# Patient Record
Sex: MALE | Race: WHITE | NOT HISPANIC OR LATINO | Employment: FULL TIME | URBAN - METROPOLITAN AREA
[De-identification: names, ages, dates, MRNs, and addresses within clinical notes are randomized per-mention and may not be internally consistent; named-entity substitution may affect disease eponyms.]

---

## 2017-01-30 ENCOUNTER — TELEPHONE (OUTPATIENT)
Dept: FAMILY MEDICINE | Facility: CLINIC | Age: 37
End: 2017-01-30

## 2017-01-30 NOTE — TELEPHONE ENCOUNTER
LOW BACK PAIN nursing triage note:  Jerome Urbina is a 36 year old male reports having  Upper back pain. Pain began this morning. Mechanism of injury:unknown.  The pain is described as spasms and it is located in the center low back. The pain Does not radiate    Does this patient have ANY of the following conditions?  Sudden onset or otherwise unexplained loss or changes in bowel or bladder control? No   Numbness in the groin / hip area (saddle anesthesia)? No   Suspected Fracture (due to mechanism of injury, history of osteoporosis, or long-term steroid use) or history of Cancer? No     Have you had your back pain for more than 6 weeks? No   Fever 38 C or 100.4 F for greater than 48 hours? No   Unrelenting night pain or no relief of pain at rest? No   Abdominal pain, urinary symptoms and/or nausea/vomiting? No   New onset of numbness or weakness of the leg not attributed to pain? No   Less than 18 years old or older than 65? No   Patient pregnant? No   Work or MVA-related injury was cause of back pain? No   Patient has history of gastric ulcers, chronic kidney disease, CHF, or coumadin use? No   Patient sees another specialist for LBP? No     Other symptoms or additional concerns: none  Discussed symptom(s) with provider NO        Functional and Psychosocial Screen (Cinthia STarT Back):    Not performed today    Follow up: apt made using same day hold for tomorrow, today he will use ice, Advil and gentle stretches. He will go to urgent care for numbness tingling to extremity's, fever, change in bowel or bladder habits or pain worsens.  Patient verbalizes understanding of this plan.        Georgie Landry,Clinic Rn  [place]

## 2017-01-31 ENCOUNTER — OFFICE VISIT (OUTPATIENT)
Dept: FAMILY MEDICINE | Facility: CLINIC | Age: 37
End: 2017-01-31
Payer: COMMERCIAL

## 2017-01-31 VITALS
RESPIRATION RATE: 20 BRPM | DIASTOLIC BLOOD PRESSURE: 82 MMHG | HEIGHT: 72 IN | TEMPERATURE: 97.9 F | WEIGHT: 220 LBS | SYSTOLIC BLOOD PRESSURE: 118 MMHG | BODY MASS INDEX: 29.8 KG/M2 | HEART RATE: 72 BPM

## 2017-01-31 DIAGNOSIS — S29.012A UPPER BACK STRAIN, INITIAL ENCOUNTER: Primary | ICD-10-CM

## 2017-01-31 PROCEDURE — 99213 OFFICE O/P EST LOW 20 MIN: CPT | Performed by: FAMILY MEDICINE

## 2017-01-31 RX ORDER — CYCLOBENZAPRINE HCL 10 MG
10 TABLET ORAL
Qty: 14 TABLET | Refills: 1 | Status: SHIPPED | OUTPATIENT
Start: 2017-01-31 | End: 2018-12-21

## 2017-01-31 NOTE — PROGRESS NOTES
SUBJECTIVE:                                                    Jerome Urbina is a 36 year old male who presents to clinic today for the following health issues:      Back Pain      Duration: 2 days        Specific cause: turning/bending    Description:   Location of pain: middle of back bilateral  Character of pain: sharp and intermittent  Pain radiation:none  New numbness or weakness in legs, not attributed to pain:  no     Intensity: Currently 6/10    History:   Pain interferes with job: YES, off for the next few weeks -  baby at home   History of back problems: no prior back problems  Any previous MRI or X-rays: None  Sees a specialist for back pain:  No  Therapies tried without relief: Advil    Alleviating factors:   Improved by: cold      Precipitating factors:  Worsened by: Bending and Walking    Functional and Psychosocial Screen (Cinthia STarT Back):      -       Accompanying Signs & Symptoms:  Risk of Fracture:  None  Risk of Cauda Equina:  None  Risk of Infection:  None  Risk of Cancer:  None  Risk of Ankylosing Spondylitis:  Onset at age <35, male, AND morning back stiffness. no                          No trauma, no bladder and bowel problems, no rectal numbness,           Problem list and histories reviewed & adjusted, as indicated.  Additional history: as documented    Patient Active Problem List   Diagnosis     CARDIOVASCULAR SCREENING; LDL GOAL LESS THAN 160     Hypertension goal BP (blood pressure) < 140/90     Past Surgical History   Procedure Laterality Date     Surgical history of -   00     Right Shoulder Arthroscopy  (Dr. Pappas @ [facility])       Social History   Substance Use Topics     Smoking status: Never Smoker      Smokeless tobacco: Former User     Types: Chew     Quit date: 2007      Comment: Does on social occasions, smokes a cigar or chews tobacco.     Alcohol Use: No     Family History   Problem Relation Age of Onset     Hypertension Father       "DIABETES Maternal Grandmother      Cardiovascular Maternal Grandmother      Alcohol/Drug Maternal Grandfather      alcohol     Alzheimer Disease Paternal Grandfather      CANCER Mother      Breast     CANCER Father      skin cancer, sq           ROS:  Constitutional, HEENT, cardiovascular, pulmonary, GI, , musculoskeletal, neuro, skin, endocrine and psych systems are negative, except as otherwise noted.    OBJECTIVE:                                                    /82 mmHg  Pulse 72  Temp(Src) 97.9  F (36.6  C) (Oral)  Resp 20  Ht 5' 11.75\" (1.822 m)  Wt 220 lb (99.791 kg)  BMI 30.06 kg/m2  Body mass index is 30.06 kg/(m^2).  GENERAL: healthy, alert and no distress  RESP: lungs clear to auscultation - no rales, rhonchi or wheezes  CV: regular rate and rhythm, normal S1 S2, no S3 or S4, no murmur, click or rub, no peripheral edema and peripheral pulses strong  ABDOMEN: soft, nontender, no hepatosplenomegaly, no masses and bowel sounds normal  MS: no gross musculoskeletal defects noted, no edema  NEURO: Normal strength and tone, mentation intact and speech normal  Comprehensive back pain exam:  Tenderness of mid back paraspinal muscles bilaterally, Range of motion not limited by pain, Lower extremity strength functional and equal on both sides, Lower extremity reflexes within normal limits bilaterally, Lower extremity sensation normal and equal on both sides and Straight leg raise negative bilaterally    Diagnostic Test Results:  none      ASSESSMENT/PLAN:                                                        ICD-10-CM    1. Upper back strain, initial encounter S29.012A cyclobenzaprine (FLEXERIL) 10 MG tablet     [facility], [facility], AND CH[facility]RACTIC REFERRAL     Advised ice/heat/nsaids, muscle relaxants  Follow up with physical therapy as planned    See Patient Instructions    Josh Coreas [facility]    "

## 2017-01-31 NOTE — NURSING NOTE
"Chief Complaint   Patient presents with     Back Pain     mid back pain since yesterday        Initial /82 mmHg  Pulse 72  Temp(Src) 97.9  F (36.6  C) (Oral)  Resp 20  Ht 5' 11.75\" (1.822 m)  Wt 220 lb (99.791 kg)  BMI 30.06 kg/m2 Estimated body mass index is 30.06 kg/(m^2) as calculated from the following:    Height as of this encounter: 5' 11.75\" (1.822 m).    Weight as of this encounter: 220 lb (99.791 kg).  BP completed using cuff size: bertha Beyer CMA (AAMA)      "

## 2017-01-31 NOTE — MR AVS SNAPSHOT
After Visit Summary   1/31/2017    Jerome Urbina    MRN: [id number]           Patient Information     Date Of Birth          1980        Visit Information        Provider Department      1/31/2017 10:20 AM Josh [facility]as DO [facility]        Today's Diagnoses     Upper back strain, initial encounter    -  1       Care Instructions    Please use aleve or muscle relaxants  Follow up with physical therapy if not resolving  Josh Coreas D.O.    [facility]   Discharged by : Melissa Chao MA    Paper scripts provided to patient : no     If you have any questions regarding your visit please contact your care team:     Team Gold Clinic Hours Telephone Number   Dr. Beatrice Mari PA-C   7am-7pm Monday - Thursday   7am-5pm Fridays  [phone]   (Appointment scheduling available 24/7)   RN Line   [phone] option 2       For a Price Quote for your services, please call our Consumer Price Line at [phone].     What options do I have for visits at the clinic other than the traditional office visit?     To expand how we care for you, many of our providers are utilizing electronic visits (e-visits) and telephone visits, when medically appropriate, for interactions with their patients rather than a visit in the clinic. We also offer nurse visits for many medical concerns. Just like any other service, we will bill your insurance company for this type of visit based on time spent on the phone with your provider. Not all insurance companies cover these visits. Please check with your medical insurance if this type of visit is covered. You will be responsible for any charges that are not paid by your insurance.   E-visits via Well Done: generally incur a $35.00 fee.     Telephone visits:   Time spent on the phone: *charged based on time that is spent on the phone in increments of 10 minutes. Estimated  cost:   5-10 mins $30.00   11-20 mins. $59.00   21-30 mins. $85.00     Use Kuznech (secure email communication and access to your chart) to send your primary care provider a message or make an appointment. Ask someone on your Team how to sign up for Kuznech.     As always, Thank you for trusting us with your health care needs!      [facility]:    Scheduling Appointments  Abhay Shultz [facility]  Call: [phone]    [facility]  Call: [phone]    [facility]  Call: [phone]      WHERE TO GO FOR CARE?    Clinic    Make an appointment if you:       Are sick (cold, cough, flu, sore throat, earache or in pain).       Have a small injury (sprain, small cut, burn or broken bone).       Need a physical exam, Pap smear, vaccine or prescription refill.       Have questions about your health or medicines.    To reach us:      Call [phone] ([phone]). Open 24 hours every day. (For counseling services, call [phone].)    Log into Kuznech at [email or web address].[email or web address]. (Visit [email or web address] to create an account.) Hospital emergency room    An emergency is a serious or life- threatening problem that must be treated right away.    Call 172 or get to the hospital if you have:      Very bad or sudden:            - Chest pain or pressure         - Bleeding         - Head or belly pain         - Dizziness or trouble seeing, walking or                          Speaking      Problems breathing      Blood in your vomit or you are coughing up blood      A major injury (knocked out, loss of a finger or limb, rape, broken bone protruding from skin)    A mental health crisis. (Or call the Mental Health Crisis line at 1-963.868.9983 or Suicide Prevention Hotline at 1-863.292.9180.)    Open 24 hours every day. You don't need an appointment.     Urgent care    Visit urgent care for sickness or small injuries when the clinic is closed. You  don't need an appointment. To check hours or find an urgent care near you, visit [email or web address]. Online care    Get online care from [facility] [place] [facility]\"" for more than 70 common problems, like colds, allergies and infections. Open 24 hours every day at: [email or web address]   Need help deciding?    For advice about where to be seen, you may call your clinic and ask to speak with a nurse. We're here for you 24 hours every day.         If you are deaf or hard of hearing, please let us know. We provide many free services including sign language interpreters, oral interpreters, TTYs, telephone amplifiers, note takers and written materials.                       Follow-ups after your visit        Additional Services     [facility], HAND, AND CHIROPRACTIC REFERRAL       **This order will print in the [facility] Scheduling Office**    Physical Therapy, Hand Therapy and Chiropractic Care are available through:    *[facility]  *[facility]  *[facility]    Call one number to schedule at any of the above locations: [phone].    Your provider has referred you to: [facility] - PT and/or Chiropractic Care determined by clinical presentation at [facility] or [facility] Initial Visit    Indication/Reason for Referral: back strain  Onset of Illness: acute  Therapy Orders: Evaluate and Treat  Special Programs: None  Special Request: None    Cinthia Vicente      Additional Comments for the Therapist or Chiropractor:       Please be aware that coverage of these services is subject to the terms and limitations of your health insurance plan.  Call member services at your health plan with any benefit or coverage questions.      Please bring the following to your appointment:    *Your personal calendar for scheduling future appointments  *Comfortable clothing                  Who to contact     If you have questions or need follow up information about today's clinic visit or your  "schedule please contact [facility] directly at [phone].  Normal or non-critical lab and imaging results will be communicated to you by MyChart, letter or phone within 4 business days after the clinic has received the results. If you do not hear from us within 7 days, please contact the clinic through DreamFace Interactivehart or phone. If you have a critical or abnormal lab result, we will notify you by phone as soon as possible.  Submit refill requests through Antegrin Therapeutics or call your pharmacy and they will forward the refill request to us. Please allow 3 business days for your refill to be completed.          Additional Information About Your Visit        DreamFace InteractiveharUtility and Environmental Solutions Information     Antegrin Therapeutics gives you secure access to your electronic health record. If you see a primary care provider, you can also send messages to your care team and make appointments. If you have questions, please call your primary care clinic.  If you do not have a primary care provider, please call [phone] and they will assist you.        Care EveryWhere ID     This is your Care EveryWhere ID. This could be used by other organizations to access your [facility] medical records  [id number]        Your Vitals Were     Pulse Temperature Respirations Height BMI (Body Mass Index)       72 97.9  F (36.6  C) (Oral) 20 5' 11.75\" (1.822 m) 30.06 kg/m2        Blood Pressure from Last 3 Encounters:   01/31/17 118/82   08/31/15 120/72   02/18/14 126/72    Weight from Last 3 Encounters:   01/31/17 220 lb (99.791 kg)   08/31/15 218 lb (98.884 kg)   02/18/14 224 lb (101.606 kg)              We Performed the Following     [facility] PT, HAND, AND CHIROPRACTIC REFERRAL          Today's Medication Changes          These changes are accurate as of: 1/31/17 10:44 AM.  If you have any questions, ask your nurse or doctor.               Start taking these medicines.        Dose/Directions    cyclobenzaprine 10 MG tablet   Commonly known as:  FLEXERIL   Used for:  Upper back " strain, initial encounter   Started by:  Josh Coreas DO        Dose:  10 mg   Take 1 tablet (10 mg) by mouth nightly as needed for muscle spasms   Quantity:  14 tablet   Refills:  1            Where to get your medicines      These medications were sent to [place] [address] [facility] - [address]  [address], [place] [address]     Phone:  [phone]    - cyclobenzaprine 10 MG tablet             Primary Care Provider Office Phone # Fax #    Josh Coreas [phone]       [facility] [address] [address]        Thank you!     Thank you for choosing [facility]  for your care. Our goal is always to provide you with excellent care. Hearing back from our patients is one way we can continue to improve our services. Please take a few minutes to complete the written survey that you may receive in the mail after your visit with us. Thank you!             Your Updated Medication List - Protect others around you: Learn how to safely use, store and throw away your medicines at www.disposemymeds.org.          This list is accurate as of: 1/31/17 10:44 AM.  Always use your most recent med list.                   Brand Name Dispense Instructions for use    cyclobenzaprine 10 MG tablet    FLEXERIL    14 tablet    Take 1 tablet (10 mg) by mouth nightly as needed for muscle spasms       NO ACTIVE MEDICATIONS

## 2017-01-31 NOTE — PATIENT INSTRUCTIONS
Please use aleve or muscle relaxants  Follow up with physical therapy if not resolving  Josh Coreas D.O.    [facility]   Discharged by : Melissa Chao MA    Paper scripts provided to patient : no     If you have any questions regarding your visit please contact your care team:     Team [facility] Clinic Hours Telephone Number   Dr. Beatrice Mari, VELMA   7am-7pm Monday - Thursday   7am-5pm Fridays  [phone]   (Appointment scheduling available 24/7)   RN Line   [phone] option 2       For a Price Quote for your services, please call our Consumer Price Line at [phone].     What options do I have for visits at the clinic other than the traditional office visit?     To expand how we care for you, many of our providers are utilizing electronic visits (e-visits) and telephone visits, when medically appropriate, for interactions with their patients rather than a visit in the clinic. We also offer nurse visits for many medical concerns. Just like any other service, we will bill your insurance company for this type of visit based on time spent on the phone with your provider. Not all insurance companies cover these visits. Please check with your medical insurance if this type of visit is covered. You will be responsible for any charges that are not paid by your insurance.   E-visits via Movlit: generally incur a $35.00 fee.     Telephone visits:   Time spent on the phone: *charged based on time that is spent on the phone in increments of 10 minutes. Estimated cost:   5-10 mins $30.00   11-20 mins. $59.00   21-30 mins. $85.00     Use LoveLulahart (secure email communication and access to your chart) to send your primary care provider a message or make an appointment. Ask someone on your Team how to sign up for "[employer].     As always, Thank you for trusting us with your health care needs!      [facility]  Services:    Scheduling Appointments  Abhay Shultz [facility]  Call: [phone]    [facility]  Call: [phone]    [facility]  Call: [phone]      WHERE TO GO FOR CARE?    Clinic    Make an appointment if you:       Are sick (cold, cough, flu, sore throat, earache or in pain).       Have a small injury (sprain, small cut, burn or broken bone).       Need a physical exam, Pap smear, vaccine or prescription refill.       Have questions about your health or medicines.    To reach us:      Call [phone] ([phone]). Open 24 hours every day. (For counseling services, call [phone].)    Log into Alorum at Kryptiq. (Visit Hybrid Logic to create an account.) Hospital emergency room    An emergency is a serious or life- threatening problem that must be treated right away.    Call 977 or get to the hospital if you have:      Very bad or sudden:            - Chest pain or pressure         - Bleeding         - Head or belly pain         - Dizziness or trouble seeing, walking or                          Speaking      Problems breathing      Blood in your vomit or you are coughing up blood      A major injury (knocked out, loss of a finger or limb, rape, broken bone protruding from skin)    A mental health crisis. (Or call the Mental Health Crisis line at 1-317.179.4198 or Suicide Prevention Hotline at 1-630.432.8404.)    Open 24 hours every day. You don't need an appointment.     Urgent care    Visit urgent care for sickness or small injuries when the clinic is closed. You don't need an appointment. To check hours or find an urgent care near you, visit [email or web address]. Online care    Get online care from [facility] for more than 70 common problems, like colds, allergies and infections. Open 24 hours every day at: [email or web address]   Need help deciding?    For advice about where to be seen, you may call your clinic  and ask to speak with a nurse. We're here for you 24 hours every day.         If you are deaf or hard of hearing, please let us know. We provide many free services including sign language interpreters, oral interpreters, TTYs, telephone amplifiers, note takers and written materials.

## 2017-02-14 ENCOUNTER — THERAPY VISIT (OUTPATIENT)
Dept: PHYSICAL THERAPY | Facility: CLINIC | Age: 37
End: 2017-02-14
Payer: COMMERCIAL

## 2017-02-14 DIAGNOSIS — M54.6 BILATERAL THORACIC BACK PAIN: Primary | ICD-10-CM

## 2017-02-14 PROCEDURE — 97110 THERAPEUTIC EXERCISES: CPT | Mod: GP | Performed by: PHYSICAL THERAPIST

## 2017-02-14 PROCEDURE — 97161 PT EVAL LOW COMPLEX 20 MIN: CPT | Mod: GP | Performed by: PHYSICAL THERAPIST

## 2017-02-14 NOTE — MR AVS SNAPSHOT
After Visit Summary   2/14/2017    Jerome Urbina    MRN: [id number]           Patient Information     Date Of Birth          1980        Visit Information        Provider Department      2/14/2017 2:50 PM Edwin Dubon, PT [facility]        Today's Diagnoses     Bilateral thoracic back pain    -  1       Follow-ups after your visit        Your next 10 appointments already scheduled     Feb 21, 2017  2:50 PM CST   [facility] with Edwin Dubon, PT   [facility] ([facility])    [address]  [address]  [place] [address]   [phone]            Feb 28, 2017  1:30 PM CST   [facility] with Edwin Dubon, PT   [facility] ([facility])    [address]  [address]  [place] [id number] [phone]              Who to contact     If you have questions or need follow up information about today's clinic visit or your schedule please contact [facility] directly at [phone].  Normal or non-critical lab and imaging results will be communicated to you by AdMobilizehart, letter or phone within 4 business days after the clinic has received the results. If you do not hear from us within 7 days, please contact the clinic through APXt or phone. If you have a critical or abnormal lab result, we will notify you by phone as soon as possible.  Submit refill requests through NewHive or call your pharmacy and they will forward the refill request to us. Please allow 3 business days for your refill to be completed.          Additional Information About Your Visit        AdMobilizehart Information     NewHive gives you secure access to your electronic health record. If you see a primary care provider, you can also send messages to your care team and make appointments. If you have questions, please call your primary care clinic.  If you do not have a primary care provider, please call [phone] and they will assist you.        Care EveryWhere ID     This is your Care  EveryWhere ID. This could be used by other organizations to access your [place] medical records  [id number]         Blood Pressure from Last 3 Encounters:   01/31/17 118/82   08/31/15 120/72   02/18/14 126/72    Weight from Last 3 Encounters:   01/31/17 99.8 kg (220 lb)   08/31/15 98.9 kg (218 lb)   02/18/14 101.6 kg (224 lb)              We Performed the Following     PT Eval, Low Complexity (209[address])     Therapeutic Exercises        Primary Care Provider Office Phone # Fax #    Josh Coreas [phone]       [address] [address]        Thank you!     Thank you for choosing [facility]  for your care. Our goal is always to provide you with excellent care. Hearing back from our patients is one way we can continue to improve our services. Please take a few minutes to complete the written survey that you may receive in the mail after your visit with us. Thank you!             Your Updated Medication List - Protect others around you: Learn how to safely use, store and throw away your medicines at www.disposemymeds.org.          This list is accurate as of: 2/14/17  3:28 PM.  Always use your most recent med list.                   Brand Name Dispense Instructions for use    cyclobenzaprine 10 MG tablet    FLEXERIL    14 tablet    Take 1 tablet (10 mg) by mouth nightly as needed for muscle spasms       NO ACTIVE MEDICATIONS

## 2017-02-14 NOTE — PROGRESS NOTES
Subjective:    Jerome Urbina is a 36 year old male with a thoracic spine condition.  Condition occurred with:  Twisting (pulling a curtain open while leaning forward).  Condition occurred: for unknown reasons.  This is a new condition  Patient reports onset of mid back pain January 2017. Currently patient reports no pain. Patient has not had pain for last 2 weeks, but would like some exercises to help prevent this from happening in the future..    Patient reports pain:  Lower thoracic (when pain was present).  Radiates to:  None (when pain was present).  Pain is described as sharp, stabbing and aching (when pain was present) and is constant Pain Scale: 0/10.  Associated symptoms:  Loss of motion/stiffness. Pain is worse during the day.  Exacerbated by: forward flexion of the spine. and relieved by ice.  Since onset symptoms are unchanged.  Special testing: none.      General health as reported by patient is good.                      Red flags:  None as reported by the patient.                      Objective:    System              Cervical/Thoracic Evaluation  Cervical AROM: normal         Headaches: none  Cervical Myotomes:  normal                  DTR's:  normal          Cervical Dermatomes:  normal                    Cervical Palpation:  normal        Cervical Stability/Joint Clearing:  normal      Spinal Segmental Conclusions:    Level:  Hypo at T7, T6, T5 and T3                                                General     ROS    Assessment/Plan:      Patient is a 36 year old male with thoracic complaints.    Patient has the following significant findings with corresponding treatment plan.                Diagnosis 1:  Lower thoracic spine pain  Decreased joint mobility - manual therapy, therapeutic exercise and home program  Decreased strength - therapeutic exercise and home program    Therapy Evaluation Codes:   1) History comprised of:   Personal factors that impact the plan of care:      None.    Comorbidity  factors that impact the plan of care are:      None.     Medications impacting care: None.  2) Examination of Body Systems comprised of:   Body structures and functions that impact the plan of care:      Thoracic Spine.   Activity limitations that impact the plan of care are:      Lifting and twisting.  3) Clinical presentation characteristics are:   Stable/Uncomplicated.  4) Decision-Making    Low complexity using standardized patient assessment instrument and/or measureable assessment of functional outcome.  Cumulative Therapy Evaluation is: Low complexity.    Previous and current functional limitations:  (See Goal Flow Sheet for this information)    Short term and Long term goals: (See Goal Flow Sheet for this information)     Communication ability:  Patient appears to be able to clearly communicate and understand verbal and written communication and follow directions correctly.  Treatment Explanation - The following has been discussed with the patient:   RX ordered/plan of care  Anticipated outcomes  Possible risks and side effects  This patient would benefit from PT intervention to resume normal activities.   Rehab potential is good.    Patient will discharge from PT services today. This is a 1x only visit.      Please refer to the daily flowsheet for treatment today, total treatment time and time spent performing 1:1 timed codes.

## 2017-02-15 NOTE — PROGRESS NOTES
Subjective:                                       Pertinent medical history includes:  High blood pressure.  Medical allergies: no.  Other surgeries include:  Orthopedic surgery.  Current medications:  None as reported by patient.  Current occupation is construction.                                  Objective:    System    Physical Exam    General     ROS    Assessment/Plan:

## 2017-06-07 NOTE — PROGRESS NOTES
Subjective:    HPI                    Objective:    System    Physical Exam    General     ROS    Assessment/Plan:      DISCHARGE REPORT  Patient did not return to PT following the initial evaluation. Please see the evaluation note for the most recent subjective and objective findings. Patient will be discharged from PT services at this time.

## 2018-12-21 ENCOUNTER — OFFICE VISIT (OUTPATIENT)
Dept: FAMILY MEDICINE | Facility: CLINIC | Age: 38
End: 2018-12-21
Payer: COMMERCIAL

## 2018-12-21 VITALS
SYSTOLIC BLOOD PRESSURE: 134 MMHG | TEMPERATURE: 98.5 F | WEIGHT: 225.8 LBS | BODY MASS INDEX: 30.84 KG/M2 | DIASTOLIC BLOOD PRESSURE: 86 MMHG | HEART RATE: 68 BPM

## 2018-12-21 DIAGNOSIS — Z11.4 SCREENING FOR HIV (HUMAN IMMUNODEFICIENCY VIRUS): ICD-10-CM

## 2018-12-21 DIAGNOSIS — F33.1 MODERATE EPISODE OF RECURRENT MAJOR DEPRESSIVE DISORDER (H): Primary | ICD-10-CM

## 2018-12-21 DIAGNOSIS — Z23 NEED FOR PROPHYLACTIC VACCINATION AND INOCULATION AGAINST INFLUENZA: ICD-10-CM

## 2018-12-21 PROCEDURE — 99214 OFFICE O/P EST MOD 30 MIN: CPT | Performed by: FAMILY MEDICINE

## 2018-12-21 RX ORDER — ESCITALOPRAM OXALATE 10 MG/1
10 TABLET ORAL DAILY
Qty: 30 TABLET | Refills: 1 | Status: SHIPPED | OUTPATIENT
Start: 2018-12-21 | End: 2019-01-14

## 2018-12-21 ASSESSMENT — ANXIETY QUESTIONNAIRES
6. BECOMING EASILY ANNOYED OR IRRITABLE: MORE THAN HALF THE DAYS
2. NOT BEING ABLE TO STOP OR CONTROL WORRYING: SEVERAL DAYS
5. BEING SO RESTLESS THAT IT IS HARD TO SIT STILL: NOT AT ALL
7. FEELING AFRAID AS IF SOMETHING AWFUL MIGHT HAPPEN: NOT AT ALL
3. WORRYING TOO MUCH ABOUT DIFFERENT THINGS: SEVERAL DAYS
GAD7 TOTAL SCORE: 8
1. FEELING NERVOUS, ANXIOUS, OR ON EDGE: MORE THAN HALF THE DAYS
IF YOU CHECKED OFF ANY PROBLEMS ON THIS QUESTIONNAIRE, HOW DIFFICULT HAVE THESE PROBLEMS MADE IT FOR YOU TO DO YOUR WORK, TAKE CARE OF THINGS AT HOME, OR GET ALONG WITH OTHER PEOPLE: SOMEWHAT DIFFICULT

## 2018-12-21 ASSESSMENT — PATIENT HEALTH QUESTIONNAIRE - PHQ9
SUM OF ALL RESPONSES TO PHQ QUESTIONS 1-9: 12
5. POOR APPETITE OR OVEREATING: MORE THAN HALF THE DAYS

## 2018-12-21 NOTE — PROGRESS NOTES
"  SUBJECTIVE:   Jerome Urbina is a 38 year old male who presents to clinic today for the following health issues:      Abnormal Mood Symptoms  Onset: Ongoing for 1 year    Description:   Depression: YES  Anxiety: YES    Accompanying Signs & Symptoms:  Still participating in activities that you used to enjoy: somewhat  Fatigue: YES  Irritability: YES  Difficulty concentrating: YES- sometimes  Changes in appetite: YES- sometimes  Problems with sleep: YES  Heart racing/beating fast : YES- once in a while  Thoughts of hurting yourself or others: Sometimes    History:   Recent stress: YES- holiday season  Prior depression hospitalization: None  Family history of depression: Patient does not know  Family history of anxiety: Patient does not know    Precipitating factors:   Alcohol/drug use: YES- alcohol see hpi    Alleviating factors:   Patient is going to therapy and increasing exercise and changing diet and stop drinking    Therapies Tried and outcome: None      Patient is present today for worsening depression. He states that he is medicating mood symptoms with alcohol on the weekends and with increase in depression symptoms he has been drinking more alcohol. Alcohol was noted to \"numb the pain\". Worsening depression is reported to be effecting his home life. He reports that his children are starting to see his depression symptoms as he is becoming less interested in activities. Patient reported that he is able to hide depression very well, but no longer wants to hide it and wants help.     He reports that he has had depression symptoms as a kid but was able to cope with it.   He reports that his mother passed away from breast cancer after being diagnosed with breast cancer for 6 months. He reports that he is still dealing with this.     He reports that he has followed therapy about 4-5 years ago and stopped going after couple months due to improved mood and no longer needing therapy. However, recently he has followed " up with the same therapist and see his therapist twice a week. He was recommended this time to follow up with his primary care provider for medications.      He reports that he has a good job, but stressful  Has three daughters, and great wife, but his depression is effecting them.   Denies visual or auditory hallucinations.      Started to use a SAD light every other day.    Started going to the gym last week on a daily basis and eating healthier.     He plans to quit drinking alcohol. He stated that he has quit alcohol 5 or 6 years ago for one year.    He reports that he believes he has a problem with alcohol and has a family history of alcoholism.    Has used drugs in college nothing recently since 20 years ago.       He endorses worsening depression compared to anxiety. He voices that he worries a lot.      He endorses that he has thoughts of self harm, without plan. However, he notes that this has improved. He reported that he cannot imagine his kids being raised without a father.     He does not voice to have had ADHD history and endorses that he did well in school.         Never seen psychologist .     Problem list and histories reviewed & adjusted, as indicated.  Additional history: as documented    Patient Active Problem List   Diagnosis     CARDIOVASCULAR SCREENING; LDL GOAL LESS THAN 160     Hypertension goal BP (blood pressure) < 140/90     Past Surgical History:   Procedure Laterality Date     SURGICAL HISTORY OF -   5/1/00    Right Shoulder Arthroscopy  (Dr. Pappas @ [facility])       Social History     Tobacco Use     Smoking status: Never Smoker     Smokeless tobacco: Former User     Types: Chew     Tobacco comment: Does on social occasions, smokes a cigar or chews tobacco.   Substance Use Topics     Alcohol use: No     Family History   Problem Relation Age of Onset     Cancer Mother         Breast     Hypertension Father      Cancer Father         skin cancer, sq     Diabetes Maternal  Grandmother      Cardiovascular Maternal Grandmother      Alcohol/Drug Maternal Grandfather         alcohol     Alzheimer Disease Paternal Grandfather          Current Outpatient Medications   Medication Sig Dispense Refill     NO ACTIVE MEDICATIONS        No Known Allergies  Recent Labs   Lab Test 12/03/13  1009 04/12/13  1001 01/05/11  0846   LDL  --  144* 139*   HDL  --  39* 35*   TRIG  --  68 90   CR 0.79 0.83 0.82   GFRESTIMATED >90 >90 >90   GFRESTBLACK >90 >90 >90   POTASSIUM  --  4.4 4.7   TSH  --  1.03 1.11      BP Readings from Last 3 Encounters:   12/21/18 134/86   01/31/17 118/82   08/31/15 120/72    Wt Readings from Last 3 Encounters:   12/21/18 102.4 kg (225 lb 12.8 oz)   01/31/17 99.8 kg (220 lb)   08/31/15 98.9 kg (218 lb)                  Labs reviewed in EPIC    Reviewed and updated as needed this visit by clinical staff  Tobacco  Allergies  Meds  Med Hx  Surg Hx  Fam Hx  Soc Hx      Reviewed and updated as needed this visit by Provider         ROS:  Constitutional, HEENT, cardiovascular, pulmonary, GI, , musculoskeletal, neuro, skin, endocrine and psych systems are negative, except as otherwise noted.    This document serves as a record of the services and decisions personally performed and made by Josh Coreas D.O. It was created on her behalf by Tony Braswell, a trained medical scribe. The creation of this document is based on the provider's statements to the medical scribe.  Tony Braswell December 21, 2018 10:17 AM     OBJECTIVE:     /86 (BP Location: Right arm, Patient Position: Chair, Cuff Size: Adult Large)   Pulse 68   Temp 98.5  F (36.9  C) (Oral)   Wt 102.4 kg (225 lb 12.8 oz)   BMI 30.84 kg/m    Body mass index is 30.84 kg/m .  GENERAL: alert, no distress and over weight. Was wearing a boot for broken toe.   EYES: Eyes grossly normal to inspection, PERRL and conjunctivae and sclerae normal  NECK: no adenopathy, no asymmetry, masses, or scars and thyroid normal to  "palpation  RESP: lungs clear to auscultation - no rales, rhonchi or wheezes  CV: regular rate and rhythm, normal S1 S2, no S3 or S4, no murmur, click or rub, no peripheral edema and peripheral pulses strong  ABDOMEN: soft, nontender, no hepatosplenomegaly, no masses and bowel sounds normal  MS: no gross musculoskeletal defects noted, no edema  NEURO: Normal strength and tone, mentation intact and speech normal  PSYCH: mentation appears normal and affect flat    Diagnostic Test Results:  none     ASSESSMENT/PLAN:           Tobacco Cessation:   reports that  has never smoked. He quit smokeless tobacco use about 11 years ago. His smokeless tobacco use included chew.      BMI:   Estimated body mass index is 30.84 kg/m  as calculated from the following:    Height as of 1/31/17: 1.822 m (5' 11.75\").    Weight as of this encounter: 102.4 kg (225 lb 12.8 oz).   Weight management plan: Discussed healthy diet and exercise guidelines    Declined immunizations: Influenza due to Conscientious objector      ICD-10-CM    1. Moderate episode of recurrent major depressive disorder (H) F33.1 escitalopram (LEXAPRO) 10 MG tablet   2. Screening for HIV (human immunodeficiency virus) Z11.4    3. Need for prophylactic vaccination and inoculation against influenza Z23      Previous labs reviewed and discussed with patient.   Begin lexapro 10mg as directed. If worsening anxiety or depression symptoms or side effects stop the medication and notify me. Follow up here in 4 to 6 weeks for depression recheck or sooner if worsening symptoms or side effects. Take lexapro with food is recommended with food to prevent upset stomach and nausea. At this visit we can also make it an annual exam.     Also I recommend vitamin D and magnesium supplements.    Continue with healthy lifestyle choices of good nutritional intake and exercising.     Continue using SAD light.   Continue following up with therapy.      Patient instructions discussed with " patient    The information in this document, created by the medical scribe for me, accurately reflects the services I personally performed and the decisions made by me. I have reviewed and approved this document for accuracy prior to leaving the patient care area.  December 21, 2018 11:00 AM     Josh Coreas DO  [facility]

## 2018-12-21 NOTE — PATIENT INSTRUCTIONS
Begin lexapro 10mg as directed. If worsening anxiety or depression symptoms or side effects stop the medication and notify me. Follow up here in 4 to 6 weeks for depression recheck or sooner if worsening symptoms or side effects. Take lexapro with food is recommended with food to prevent upset stomach and nausea. At this visit we can also make it an annual exam.     Also I recommend vitamin D and magnesium supplements.    Continue with healthy lifestyle choices of good nutritional intake and exercising.     Continue using SAD light.   Continue following up with therapy.      [facility]   Discharged by : Kelly ARAUZ MA    If you have any questions regarding your visit please contact your care team:     Team Gold                Clinic Hours Telephone Number     Dr. Beatrice Moss, CNP  Breanna Deleon, CNP 7am-7pm  Monday - Thursday   7am-5pm  Fridays  [phone]   (Appointment scheduling available 24/7)     RN Line  [phone] option 2     Urgent Care - [place] and [facility] - 11am-9pm Monday-Friday Saturday-Sunday- 9am-5pm     [place] -   5pm-9pm Monday-Friday Saturday-Sunday- 9am-5pm    [phone] - [place]    [phone] - [place]       For a Price Quote for your services, please call our Consumer Price Line at [phone].     What options do I have for visits at the clinic other than the traditional office visit?     To expand how we care for you, many of our providers are utilizing electronic visits (e-visits) and telephone visits, when medically appropriate, for interactions with their patients rather than a visit in the clinic. We also offer nurse visits for many medical concerns. Just like any other service, we will bill your insurance company for this type of visit based on time spent on the phone with your provider. Not all insurance companies cover these visits. Please check with your  medical insurance if this type of visit is covered. You will be responsible for any charges that are not paid by your insurance.   E-visits via [employer]: generally incur a $35.00 fee.     Telephone visits:  Time spent on the phone: *charged based on time that is spent on the phone in increments of 10 minutes. Estimated cost:   5-10 mins $30.00   11-20 mins. $59.00   21-30 mins. $85.00       Use [employer] (secure email communication and access to your chart) to send your primary care provider a message or make an appointment. Ask someone on your Team how to sign up for [employer].     As always, Thank you for trusting us with your health care needs!      [facility]:    Scheduling Appointments  [place], [facility]  Call: [phone]    Hardik Pacheco [facility]  Call: [phone]    [facility]  Call: [phone]    For Gastroenterology referrals   [facility]   Clinics and Surgery Center, 4th Floor   [address] [place] [id number]   Appointments: [phone]    WHERE TO GO FOR CARE?  Clinic    Make an appointment if you:       Are sick (cold, cough, flu, sore throat, earache or in pain).       Have a small injury (sprain, small cut, burn or broken bone).       Need a physical exam, Pap smear, vaccine or prescription refill.       Have questions about your health or medicines.    To reach us:      Call [phone] ([phone]). Open 24 hours every day. (For counseling services, call [phone].)    Log into [employer] at [email or web address]. (Visit [email or web address] to create an account.) Hospital emergency room    An emergency is a serious or life- threatening problem that must be treated right away.    Call 070 or get to the hospital if you have:      Very bad or sudden:            - Chest pain or pressure         - Bleeding         - Head or belly pain         - Dizziness or trouble seeing, walking or                           Speaking      Problems breathing      Blood in your vomit or you are coughing up blood      A major injury (knocked out, loss of a finger or limb, rape, broken bone protruding from skin)    A mental health crisis. (Or call the Mental Health Crisis line at 1-390.115.3779 or Suicide Prevention Hotline at 1-957.170.2762.)    Open 24 hours every day. You don't need an appointment.     Urgent care    Visit urgent care for sickness or small injuries when the clinic is closed. You don't need an appointment. To check hours or find an urgent care near you, visit [email or web address]. Online care    Get online care from [facility] for more than 70 common problems, like colds, allergies and infections. Open 24 hours every day at:   [email or web address]   Need help deciding?    For advice about where to be seen, you may call your clinic and ask to speak with a nurse. We're here for you 24 hours every day.         If you are deaf or hard of hearing, please let us know. We provide many free services including sign language interpreters, oral interpreters, TTYs, telephone amplifiers, note takers and written materials.

## 2018-12-22 ASSESSMENT — ANXIETY QUESTIONNAIRES: GAD7 TOTAL SCORE: 8

## 2019-01-10 ENCOUNTER — MYC MEDICAL ADVICE (OUTPATIENT)
Dept: FAMILY MEDICINE | Facility: CLINIC | Age: 39
End: 2019-01-10

## 2019-01-10 DIAGNOSIS — F33.1 MODERATE EPISODE OF RECURRENT MAJOR DEPRESSIVE DISORDER (H): ICD-10-CM

## 2019-01-14 RX ORDER — ESCITALOPRAM OXALATE 10 MG/1
10 TABLET ORAL DAILY
Qty: 30 TABLET | Refills: 0 | Status: SHIPPED | OUTPATIENT
Start: 2019-01-14 | End: 2019-02-01

## 2019-02-01 ENCOUNTER — OFFICE VISIT (OUTPATIENT)
Dept: FAMILY MEDICINE | Facility: CLINIC | Age: 39
End: 2019-02-01
Payer: COMMERCIAL

## 2019-02-01 VITALS
HEART RATE: 74 BPM | DIASTOLIC BLOOD PRESSURE: 76 MMHG | WEIGHT: 222 LBS | BODY MASS INDEX: 30.07 KG/M2 | HEIGHT: 72 IN | TEMPERATURE: 98.6 F | SYSTOLIC BLOOD PRESSURE: 129 MMHG

## 2019-02-01 DIAGNOSIS — R19.5 LOOSE STOOLS: ICD-10-CM

## 2019-02-01 DIAGNOSIS — F33.1 MODERATE EPISODE OF RECURRENT MAJOR DEPRESSIVE DISORDER (H): ICD-10-CM

## 2019-02-01 DIAGNOSIS — Z13.1 SCREENING FOR DIABETES MELLITUS: ICD-10-CM

## 2019-02-01 DIAGNOSIS — E78.00 HIGH CHOLESTEROL: ICD-10-CM

## 2019-02-01 DIAGNOSIS — Z00.00 ENCOUNTER FOR ROUTINE ADULT HEALTH EXAMINATION WITHOUT ABNORMAL FINDINGS: Primary | ICD-10-CM

## 2019-02-01 LAB
ANION GAP SERPL CALCULATED.3IONS-SCNC: 2 MMOL/L (ref 3–14)
BUN SERPL-MCNC: 10 MG/DL (ref 7–30)
CALCIUM SERPL-MCNC: 9.1 MG/DL (ref 8.5–10.1)
CHLORIDE SERPL-SCNC: 108 MMOL/L (ref 94–109)
CHOLEST SERPL-MCNC: 215 MG/DL
CO2 SERPL-SCNC: 28 MMOL/L (ref 20–32)
CREAT SERPL-MCNC: 0.78 MG/DL (ref 0.66–1.25)
GFR SERPL CREATININE-BSD FRML MDRD: >90 ML/MIN/{1.73_M2}
GLUCOSE SERPL-MCNC: 94 MG/DL (ref 70–99)
HDLC SERPL-MCNC: 45 MG/DL
HGB BLD-MCNC: 13.8 G/DL (ref 13.3–17.7)
LDLC SERPL CALC-MCNC: 150 MG/DL
NONHDLC SERPL-MCNC: 170 MG/DL
POTASSIUM SERPL-SCNC: 4.4 MMOL/L (ref 3.4–5.3)
SODIUM SERPL-SCNC: 138 MMOL/L (ref 133–144)
TRIGL SERPL-MCNC: 101 MG/DL

## 2019-02-01 PROCEDURE — 85018 HEMOGLOBIN: CPT | Performed by: FAMILY MEDICINE

## 2019-02-01 PROCEDURE — 80048 BASIC METABOLIC PNL TOTAL CA: CPT | Performed by: FAMILY MEDICINE

## 2019-02-01 PROCEDURE — 80061 LIPID PANEL: CPT | Performed by: FAMILY MEDICINE

## 2019-02-01 PROCEDURE — 36415 COLL VENOUS BLD VENIPUNCTURE: CPT | Performed by: FAMILY MEDICINE

## 2019-02-01 PROCEDURE — 99395 PREV VISIT EST AGE 18-39: CPT | Performed by: FAMILY MEDICINE

## 2019-02-01 PROCEDURE — 99213 OFFICE O/P EST LOW 20 MIN: CPT | Mod: 25 | Performed by: FAMILY MEDICINE

## 2019-02-01 RX ORDER — ESCITALOPRAM OXALATE 10 MG/1
10 TABLET ORAL DAILY
Qty: 90 TABLET | Refills: 3 | Status: SHIPPED | OUTPATIENT
Start: 2019-02-01 | End: 2020-02-11

## 2019-02-01 ASSESSMENT — ENCOUNTER SYMPTOMS
ABDOMINAL PAIN: 0
DYSURIA: 0
SHORTNESS OF BREATH: 0
FEVER: 0
NAUSEA: 0
PALPITATIONS: 0
NERVOUS/ANXIOUS: 0
CHILLS: 0
HEMATOCHEZIA: 0
HEADACHES: 0
FREQUENCY: 0
COUGH: 0
EYE PAIN: 0
CONSTIPATION: 0
MYALGIAS: 0
HEMATURIA: 0
ARTHRALGIAS: 0
DIARRHEA: 1
PARESTHESIAS: 0
SORE THROAT: 0
JOINT SWELLING: 0
DIZZINESS: 0
HEARTBURN: 0
WEAKNESS: 0

## 2019-02-01 ASSESSMENT — MIFFLIN-ST. JEOR: SCORE: 1971.02

## 2019-02-01 NOTE — PROGRESS NOTES
SUBJECTIVE:   CC: Jerome Urbina is an 38 year old male who presents for preventative health visit.     Physical   Annual:     Getting at least 3 servings of Calcium per day:  Yes    Bi-annual eye exam:  Yes    Dental care twice a year:  Yes    Sleep apnea or symptoms of sleep apnea:  None    Diet:  Regular (no restrictions)    Frequency of exercise:  4-5 days/week    Duration of exercise:  45-60 minutes    Taking medications regularly:  Yes    Medication side effects:  Other    Additional concerns today:  No    PHQ-2 Total Score: 1          Depression and Anxiety Follow-Up    Status since last visit: unsure if medication is working, been taking for about two m ont    Other associated symptoms:None    Complicating factors:     Significant life event: No     Current substance abuse: None    One time a week therapy, loose stools 2-3 times a day  75% of the time it is loose  Lott was making it loose  No STOMACH ACHE  With and without food  Same anxiety        PHQ 12/21/2018   PHQ-9 Total Score 12   Q9: Suicide Ideation Several days     PRANAV-7 SCORE 12/21/2018   Total Score 8     h  PHQ-9  English  PHQ-9   Any Language  PRANAV-7  Suicide Assessment Five-step Evaluation and Treatment (SAFE-T)    Today's PHQ-2 Score:   PHQ-2 ( 1999 [employer]) 2/1/2019   Q1: Little interest or pleasure in doing things 1   Q2: Feeling down, depressed or hopeless 0   PHQ-2 Score 1   Q1: Little interest or pleasure in doing things Several days   Q2: Feeling down, depressed or hopeless Not at all   PHQ-2 Score 1       Abuse: Current or Past(Physical, Sexual or Emotional)- No  Do you feel safe in your environment? Yes    Social History     Tobacco Use     Smoking status: Never Smoker     Smokeless tobacco: Former User     Types: Chew     Tobacco comment: Does on social occasions, smokes a cigar or chews tobacco.   Substance Use Topics     Alcohol use: No     Alcohol Use 2/1/2019   If you drink alcohol do you typically have greater than 3 drinks per  day OR greater than 7 drinks per week? Not Applicable   No flowsheet data found.    Last PSA: No results found for: PSA    Reviewed orders with patient. Reviewed health maintenance and updated orders accordingly - Yes  Labs reviewed in EPIC    Reviewed and updated as needed this visit by clinical staff  Tobacco  Allergies  Meds  Med Hx  Surg Hx  Fam Hx  Soc Hx        Reviewed and updated as needed this visit by Provider        Past Medical History:   Diagnosis Date     Hypertension       Past Surgical History:   Procedure Laterality Date     SURGICAL HISTORY OF -   5/1/00    Right Shoulder Arthroscopy  (Dr. Pappas @ [facility])            Review of Systems   Constitutional: Negative for chills and fever.   HENT: Negative for congestion, ear pain, hearing loss and sore throat.    Eyes: Negative for pain and visual disturbance.   Respiratory: Negative for cough and shortness of breath.    Cardiovascular: Negative for chest pain, palpitations and peripheral edema.   Gastrointestinal: Positive for diarrhea. Negative for abdominal pain, constipation, heartburn, hematochezia and nausea.   Genitourinary: Negative for discharge, dysuria, frequency, genital sores, hematuria, impotence and urgency.   Musculoskeletal: Negative for arthralgias, joint swelling and myalgias.   Skin: Negative for rash.   Neurological: Negative for dizziness, weakness, headaches and paresthesias.   Psychiatric/Behavioral: Positive for mood changes. The patient is not nervous/anxious.      CONSTITUTIONAL: NEGATIVE for fever, chills, change in weight  INTEGUMENTARY/SKIN: NEGATIVE for worrisome rashes, moles or lesions  EYES: NEGATIVE for vision changes or irritation  ENT: NEGATIVE for ear, mouth and throat problems  RESP: NEGATIVE for significant cough or SOB  CV: NEGATIVE for chest pain, palpitations or peripheral edema  GI: NEGATIVE for nausea, abdominal pain, heartburn, or change in bowel habits   male: negative for dysuria,  "hematuria, decreased urinary stream, erectile dysfunction, urethral discharge  MUSCULOSKELETAL: NEGATIVE for significant arthralgias or myalgia  NEURO: NEGATIVE for weakness, dizziness or paresthesias  PSYCHIATRIC: NEGATIVE for changes in mood or affect    OBJECTIVE:   /76   Pulse 74   Temp 98.6  F (37  C) (Oral)   Ht 1.822 m (5' 11.75\")   Wt 100.7 kg (222 lb)   BMI 30.32 kg/m      Physical Exam  GENERAL: healthy, alert and no distress  EYES: Eyes grossly normal to inspection, PERRL and conjunctivae and sclerae normal  HENT: ear canals and TM's normal, nose and mouth without ulcers or lesions  NECK: no adenopathy, no asymmetry, masses, or scars and thyroid normal to palpation  RESP: lungs clear to auscultation - no rales, rhonchi or wheezes  CV: regular rate and rhythm, normal S1 S2, no S3 or S4, no murmur, click or rub, no peripheral edema and peripheral pulses strong  ABDOMEN: soft, nontender, no hepatosplenomegaly, no masses and bowel sounds normal  MS: no gross musculoskeletal defects noted, no edema  SKIN: no suspicious lesions or rashes  NEURO: Normal strength and tone, mentation intact and speech normal  PSYCH: mentation appears normal, affect normal/bright    Diagnostic Test Results:  Results for orders placed or performed in visit on 02/01/19 (from the past 24 hour(s))   Hemoglobin   Result Value Ref Range    Hemoglobin 13.8 13.3 - 17.7 g/dL       ASSESSMENT/PLAN:       ICD-10-CM    1. Encounter for routine adult health examination without abnormal findings Z00.00 Hemoglobin   2. Moderate episode of recurrent major depressive disorder (H) F33.1 escitalopram (LEXAPRO) 10 MG tablet   3. Screening for diabetes mellitus Z13.1 Basic metabolic panel   4. High cholesterol E78.00 Lipid panel reflex to direct LDL Fasting   5. Loose stools R19.5    check on depression-started on lexapro last month, therapy is going well, some loose stools, ? Sure if it is a reaction from med, vs ibs, vs food induced.  " "Close monitoring with diet and take med with food  Follow up in 6 months, sooner if persisting loose stools, consider switching to prozac/zoloft/paxil if not improving loose stools  Overweight/high cholesterol/elevated bp-advised weight management, dietary changes and exercise      COUNSELING:   Reviewed preventive health counseling, as reflected in patient instructions    BP Readings from Last 1 Encounters:   02/01/19 129/76     Estimated body mass index is 30.32 kg/m  as calculated from the following:    Height as of this encounter: 1.822 m (5' 11.75\").    Weight as of this encounter: 100.7 kg (222 lb).    BP Screening:   Last 3 BP Readings:    BP Readings from Last 3 Encounters:   02/01/19 129/76   12/21/18 134/86   01/31/17 118/82       The following was recommended to the patient:  Re-screen BP within a year and recommended lifestyle modifications  Weight management plan: Discussed healthy diet and exercise guidelines     reports that  has never smoked. He quit smokeless tobacco use about 11 years ago. His smokeless tobacco use included chew.      Counseling Resources:  ATP IV Guidelines  Pooled Cohorts Equation Calculator  FRAX Risk Assessment  ICSI Preventive Guidelines  Dietary Guidelines for Americans, 2010  USDA's MyPlate  ASA Prophylaxis  Lung CA Screening    Josh Coreas DO  [facility]  "

## 2019-02-01 NOTE — LETTER
"[address] [id number] [phone]                                                                                                February 4, 2019    Jerome Urbina  [address] [place] [address]        Dear Mr. Urbina,    Your cholesterol is abnormal, please use the recommendations below and recheck labs in 6-12 months.     Ways to improve your cholesterol...     1- Eats less saturated fats (including avoiding \"trans\" fats).     2 - Eat more unsaturated fats  - found in vege   tables, grains, and tree nuts.   Also by replacing butter with canola oil or olive oil.     3 - Eat more nuts.   1-2 ounces (a small handful) of almonds, walnuts, hazelnuts or pecans once a day in place of other less healthy snacks.     4 - Eat more high   fiber foods - vegetables and whole grains including oat bran, oats, beans, peas, and flax seed.     5 - Eat more fish - such as salmon, tuna, mackerel, and sardines.  1 or 2 six ounce servings per week is a healthy replacement for other proteins.     6 - E   xercise for at least 120 minutes per week - which is equal to 30 minutes 4 days per week.       Sincerely,      Josh Coreas DO/priscila  Results for orders placed or performed in visit on 02/01/19   Lipid panel reflex to direct LDL Fasting   Result Value Ref Range    Cholesterol 215 (H) <200 mg/dL    Triglycerides 101 <150 mg/dL    HDL Cholesterol 45 >39 mg/dL    LDL Cholesterol Calculated 150 (H) <100 mg/dL    Non HDL Cholesterol 170 (H) <130 mg/dL   Basic metabolic panel   Result Value Ref Range    Sodium 138 133 - 144 mmol/L    Potassium 4.4 3.4 - 5.3 mmol/L    Chloride 108 94 - 109 mmol/L    Carbon Dioxide 28 20 - 32 mmol/L    Anion Gap 2 (L) 3 - 14 mmol/L    Glucose 94 70 - 99 mg/dL    Urea Nitrogen 10 7 - 30 mg/dL    Creatinine 0.78 0.66 - 1.25 mg/dL    GFR Estimate >90 >60 mL/min/[1.73_m2]    GFR Estimate If Black >90 >60 mL/min/[1.73_m2]    Calcium 9.1 8.5 - 10.1 " mg/dL   Hemoglobin   Result Value Ref Range    Hemoglobin 13.8 13.3 - 17.7 g/dL

## 2019-02-01 NOTE — PATIENT INSTRUCTIONS
Cont meds as planned  Labs today  Preventive Health Recommendations  Male Ages 26 - 39    Yearly exam:             See your health care provider every year in order to  o   Review health changes.   o   Discuss preventive care.    o   Review your medicines if your doctor has prescribed any.    You should be tested each year for STDs (sexually transmitted diseases), if you re at risk.     After age 35, talk to your provider about cholesterol testing. If you are at risk for heart disease, have your cholesterol tested at least every 5 years.     If you are at risk for diabetes, you should have a diabetes test (fasting glucose).  Shots: Get a flu shot each year. Get a tetanus shot every 10 years.     Nutrition:    Eat at least 5 servings of fruits and vegetables daily.     Eat whole-grain bread, whole-wheat pasta and brown rice instead of white grains and rice.     Get adequate Calcium and Vitamin D.     Lifestyle    Exercise for at least 150 minutes a week (30 minutes a day, 5 days a week). This will help you control your weight and prevent disease.     Limit alcohol to one drink per day.     No smoking.     Wear sunscreen to prevent skin cancer.     See your dentist every six months for an exam and cleaning.   [facility]   Discharged by : Neno Spence CMA on 2/1/2019 at 8:09 AM    Paper scripts provided to patient :      If you have any questions regarding your visit please contact your care team:     Team Gold                Clinic Hours Telephone Number     Dr. Beatrice Deleon, CNP 7am-7pm  Monday - Thursday   7am-5pm  Fridays  [phone]   (Appointment scheduling available 24/7)     RN Line  [phone] option 2     Urgent [facility] - [place] - 11am-9pm Monday-Friday Saturday-Sunday- 9am-5pm     [place] -   5pm-9pm Monday-Friday Saturday-Sunday- 9am-5pm    [phone] - [place]    [phone] - [place]     For a Price Quote for your services, please call our Consumer Price Line at [phone].     What options do I have for visits at the clinic other than the traditional office visit?     To expand how we care for you, many of our providers are utilizing electronic visits (e-visits) and telephone visits, when medically appropriate, for interactions with their patients rather than a visit in the clinic. We also offer nurse visits for many medical concerns. Just like any other service, we will bill your insurance company for this type of visit based on time spent on the phone with your provider. Not all insurance companies cover these visits. Please check with your medical insurance if this type of visit is covered. You will be responsible for any charges that are not paid by your insurance.   E-visits via ACTON: generally incur a $35.00 fee.     Telephone visits:  Time spent on the phone: *charged based on time that is spent on the phone in increments of 10 minutes. Estimated cost:   5-10 mins $30.00   11-20 mins. $59.00   21-30 mins. $85.00     Use ACTON (secure email communication and access to your chart) to send your primary care provider a message or make an appointment. Ask someone on your Team how to sign up for ACTON.     As always, Thank you for trusting us with your health care needs!    [facility]:    Scheduling Appointments  [facility]  Call: [phone]    [facility]  Call: [phone]    [facility]  Call: [phone]    For Gastroenterology referrals   [facility]   Clin[facility] and [facility] Center, 4th Floor   [address] [place] [id number]   Appointments: [phone]    WHERE TO GO FOR CARE?  Clinic    Make an appointment if you:       Are sick (cold, cough, flu, sore throat, earache or in pain).       Have a small injury (sprain, small cut, burn or broken  bone).       Need a physical exam, Pap smear, vaccine or prescription refill.       Have questions about your health or medicines.    To reach us:      Call [phone] ([phone]). Open 24 hours every day. (For counseling services, call [phone].)    Log into [employer]. (Visit [email or web address] to create an account.) Hospital emergency room    An emergency is a serious or life- threatening problem that must be treated right away.    Call 911 or get to the hospital if you have:      Very bad or sudden:            - Chest pain or pressure         - Bleeding         - Head or belly pain         - Dizziness or trouble seeing, walking or                          Speaking      Problems breathing      Blood in your vomit or you are coughing up blood      A major injury (knocked out, loss of a finger or limb, rape, broken bone protruding from skin)    A mental health crisis. (Or call the Mental Health Crisis line at 1-947.134.9235 or Suicide Prevention Hotline at 1-346.257.4096.)    Open 24 hours every day. You don't need an appointment.     Urgent care    Visit urgent care for sickness or small injuries when the clinic is closed. You don't need an appointment. To check hours or find an urgent care near you, visit [email or web address]. Online care    Get online care from [facility] for more than 70 common problems, like colds, allergies and infections. Open 24 hours every day at:   [email or web address]   Need help deciding?    For advice about where to be seen, you may call your clinic and ask to speak with a nurse. We're here for you 24 hours every day.         If you are deaf or hard of hearing, please let us know. We provide many free services including sign language interpreters, oral interpreters, TTYs, telephone amplifiers, note takers and written materials.

## 2019-02-03 NOTE — RESULT ENCOUNTER NOTE
"Your cholesterol is abnormal, please use the recommendations below and recheck labs in 6-12 months.    Ways to improve your cholesterol...    1- Eats less saturated fats (including avoiding \"trans\" fats).    2 - Eat more unsaturated fats  - found in vege  tables, grains, and tree nuts.   Also by replacing butter with canola oil or olive oil.    3 - Eat more nuts.   1-2 ounces (a small handful) of almonds, walnuts, hazelnuts or pecans once a  day in place of other less healthy snacks.    4 - Eat more high   fiber foods - vegetables and whole grains including oat bran, oats, beans, peas, and flax seed.    5 - Eat more fish - such as salmon, tuna, mackerel, and sardines.  1 or 2 six ounce servings per week is a healthy replacement for other proteins.    6 - E  xercise for at least 120 minutes per week - which is equal to 30 minutes 4 days per week.    Josh Coreas D.O.    "

## 2019-07-22 ENCOUNTER — TELEPHONE (OUTPATIENT)
Dept: FAMILY MEDICINE | Facility: CLINIC | Age: 39
End: 2019-07-22

## 2019-07-22 NOTE — TELEPHONE ENCOUNTER
Panel Management Review      Patient has the following on his problem list:     Depression / Dysthymia review    Measure:  Needs PHQ-9 score of 4 or less during index window.  Administer PHQ-9 and if score is 5 or more, send encounter to provider for next steps.    5 - 7 month window range: 4/22-8/20/19    PHQ-9 SCORE 12/21/2018   PHQ-9 Total Score 12       If PHQ-9 recheck is 5 or more, route to provider for next steps.    Patient is due for:  PHQ9      Composite cancer screening  Chart review shows that this patient is due/due soon for the following None  Summary:    Patient is due/failing the following:   PHQ9    Action needed:   Patient needs to do PHQ9.    Type of outreach:    Routed to care team for outreach    Questions for provider review:    None                                                                                                                                    Remedios Alarcon        Chart routed to Care Team .

## 2019-07-24 NOTE — TELEPHONE ENCOUNTER
Panel Management Review      Patient has the following on his problem list:     Depression / Dysthymia review    Measure:  Needs PHQ-9 score of 4 or less during index window.  Administer PHQ-9 and if score is 5 or more, send encounter to provider for next steps.    5 - 7 month window range: Due by 8/20    PHQ-9 SCORE 12/21/2018   PHQ-9 Total Score 12       If PHQ-9 recheck is 5 or more, route to provider for next steps.    Patient is due for:  PHQ9      Composite cancer screening  Chart review shows that this patient is due/due soon for the following None  Summary:    Patient is due/failing the following:   PHQ9    Action needed:   Patient needs to do PHQ9.    Type of outreach:    Sent [employer] message.    Questions for provider review:    None                                                                                                                                    Melissa Chao MA       Chart routed to Care Team .

## 2019-11-03 ENCOUNTER — HEALTH MAINTENANCE LETTER (OUTPATIENT)
Age: 39
End: 2019-11-03

## 2020-01-17 ENCOUNTER — TELEPHONE (OUTPATIENT)
Dept: FAMILY MEDICINE | Facility: CLINIC | Age: 40
End: 2020-01-17

## 2020-01-17 NOTE — TELEPHONE ENCOUNTER
Patient Quality Outreach Summary      Summary:    Patient is due/failing the following:   PHQ-9 Needed, Adult/Adolescent physical, date due: 2/1/20 and Immunizations (Influenza)    12 month remission PHQ-9 follow up date range: 10/22-2/19    Type of outreach:    Sent [employer] message.    Questions for provider review:    None                                                                                                                    Remedios Alarcon,        Chart routed to Care Team.

## 2020-01-24 NOTE — TELEPHONE ENCOUNTER
Panel Management Review  Summary:    Type of outreach:    Phone, left message for patient to call back.     Encounter routed to No Action Needed.                                                                                                                               Remedios Alarcon,

## 2020-01-29 ENCOUNTER — MYC MEDICAL ADVICE (OUTPATIENT)
Dept: FAMILY MEDICINE | Facility: CLINIC | Age: 40
End: 2020-01-29

## 2020-01-29 ASSESSMENT — PATIENT HEALTH QUESTIONNAIRE - PHQ9
SUM OF ALL RESPONSES TO PHQ QUESTIONS 1-9: 2
10. IF YOU CHECKED OFF ANY PROBLEMS, HOW DIFFICULT HAVE THESE PROBLEMS MADE IT FOR YOU TO DO YOUR WORK, TAKE CARE OF THINGS AT HOME, OR GET ALONG WITH OTHER PEOPLE: NOT DIFFICULT AT ALL
SUM OF ALL RESPONSES TO PHQ QUESTIONS 1-9: 2

## 2020-01-29 NOTE — TELEPHONE ENCOUNTER
PHQ-9 completed with score of 2.     Remission is defined as a follow-up PHQ-9 score less than 5. Route to provider for review if score is 5 or above.    Chart routed to: No Action Needed and scheduled appointment with Dr. Coreas.    Remedios Alarcon,

## 2020-01-29 NOTE — TELEPHONE ENCOUNTER
PHQ-9 completed with score of 2.     Remission is defined as a follow-up PHQ-9 score less than 5. Route to provider for review if score is 5 or above.    Chart routed to: No Action Needed and patient scheduled appointment with Dr. Coreas.    Remedios Alarcon,

## 2020-01-30 ASSESSMENT — PATIENT HEALTH QUESTIONNAIRE - PHQ9: SUM OF ALL RESPONSES TO PHQ QUESTIONS 1-9: 2

## 2020-02-09 DIAGNOSIS — F33.1 MODERATE EPISODE OF RECURRENT MAJOR DEPRESSIVE DISORDER (H): ICD-10-CM

## 2020-02-10 NOTE — TELEPHONE ENCOUNTER
"Requested Prescriptions   Pending Prescriptions Disp Refills     escitalopram (LEXAPRO) 10 MG tablet [Pharmacy Med Name: ESCITALOPRAM 10 MG TABLET]  Last Written Prescription Date:  2/1/2019  Last Fill Quantity: 90 tablet,  # refills: 3   Last office visit: 2/1/2019 with prescribing provider:  LEONEL Coreas   Future Office Visit:   Next 5 appointments (look out 90 days)    Feb 17, 2020  7:40 AM CST  Phil Gaspar with Josh Coreas DO  [facility] ([facility] [address] [place] [id number] [phone]        90 tablet 3     Sig: TAKE 1 TABLET BY MOUTH EVERY DAY       SSRIs Protocol Passed - 2/9/2020 12:50 AM        Passed - PHQ-9 score less than 5 in past 6 months     Please review last PHQ-9 score.   PHQ-9 SCORE 12/21/2018 1/29/2020   PHQ-9 Total Score MyChart - 2 (Minimal depression)   PHQ-9 Total Score 12 2     PRANAV-7 SCORE 12/21/2018   Total Score 8             Passed - Medication is active on med list        Passed - Patient is age 18 or older        Passed - Recent (6 mo) or future (30 days) visit within the authorizing provider's specialty     Patient had office visit in the last 6 months or has a visit in the next 30 days with authorizing provider or within the authorizing provider's specialty.  See \"Patient Info\" tab in inbasket, or \"Choose Columns\" in Meds & Orders section of the refill encounter.            "

## 2020-02-11 RX ORDER — ESCITALOPRAM OXALATE 10 MG/1
TABLET ORAL
Qty: 90 TABLET | Refills: 0 | Status: SHIPPED | OUTPATIENT
Start: 2020-02-11 | End: 2020-02-17

## 2020-02-11 NOTE — TELEPHONE ENCOUNTER
Prescription approved per [facility] Refill Protocol.    Sahra Tracey, RN, BSN, PHN  [facility] [place]

## 2020-02-17 ENCOUNTER — OFFICE VISIT (OUTPATIENT)
Dept: FAMILY MEDICINE | Facility: CLINIC | Age: 40
End: 2020-02-17
Payer: COMMERCIAL

## 2020-02-17 VITALS
TEMPERATURE: 97.9 F | BODY MASS INDEX: 28.85 KG/M2 | HEIGHT: 72 IN | DIASTOLIC BLOOD PRESSURE: 78 MMHG | WEIGHT: 213 LBS | SYSTOLIC BLOOD PRESSURE: 122 MMHG

## 2020-02-17 DIAGNOSIS — R19.5 LOOSE STOOLS: ICD-10-CM

## 2020-02-17 DIAGNOSIS — F33.1 MODERATE EPISODE OF RECURRENT MAJOR DEPRESSIVE DISORDER (H): ICD-10-CM

## 2020-02-17 DIAGNOSIS — I10 HYPERTENSION GOAL BP (BLOOD PRESSURE) < 140/90: Primary | ICD-10-CM

## 2020-02-17 DIAGNOSIS — Z13.29 SCREENING FOR THYROID DISORDER: ICD-10-CM

## 2020-02-17 LAB
BASOPHILS # BLD AUTO: 0 10E9/L (ref 0–0.2)
BASOPHILS NFR BLD AUTO: 0.7 %
DIFFERENTIAL METHOD BLD: ABNORMAL
EOSINOPHIL # BLD AUTO: 0.1 10E9/L (ref 0–0.7)
EOSINOPHIL NFR BLD AUTO: 2.7 %
ERYTHROCYTE [DISTWIDTH] IN BLOOD BY AUTOMATED COUNT: 14 % (ref 10–15)
HCT VFR BLD AUTO: 44.6 % (ref 40–53)
HGB BLD-MCNC: 14 G/DL (ref 13.3–17.7)
IGA SERPL-MCNC: 399 MG/DL (ref 84–499)
LYMPHOCYTES # BLD AUTO: 1.4 10E9/L (ref 0.8–5.3)
LYMPHOCYTES NFR BLD AUTO: 31.4 %
MCH RBC QN AUTO: 28.4 PG (ref 26.5–33)
MCHC RBC AUTO-ENTMCNC: 31.4 G/DL (ref 31.5–36.5)
MCV RBC AUTO: 91 FL (ref 78–100)
MONOCYTES # BLD AUTO: 0.4 10E9/L (ref 0–1.3)
MONOCYTES NFR BLD AUTO: 8.8 %
NEUTROPHILS # BLD AUTO: 2.6 10E9/L (ref 1.6–8.3)
NEUTROPHILS NFR BLD AUTO: 56.4 %
PLATELET # BLD AUTO: 211 10E9/L (ref 150–450)
RBC # BLD AUTO: 4.93 10E12/L (ref 4.4–5.9)
WBC # BLD AUTO: 4.5 10E9/L (ref 4–11)

## 2020-02-17 PROCEDURE — 80050 GENERAL HEALTH PANEL: CPT | Performed by: FAMILY MEDICINE

## 2020-02-17 PROCEDURE — 83516 IMMUNOASSAY NONANTIBODY: CPT | Performed by: FAMILY MEDICINE

## 2020-02-17 PROCEDURE — 36415 COLL VENOUS BLD VENIPUNCTURE: CPT | Performed by: FAMILY MEDICINE

## 2020-02-17 PROCEDURE — 99214 OFFICE O/P EST MOD 30 MIN: CPT | Performed by: FAMILY MEDICINE

## 2020-02-17 PROCEDURE — 82784 ASSAY IGA/IGD/IGG/IGM EACH: CPT | Performed by: FAMILY MEDICINE

## 2020-02-17 PROCEDURE — 80061 LIPID PANEL: CPT | Performed by: FAMILY MEDICINE

## 2020-02-17 RX ORDER — ESCITALOPRAM OXALATE 10 MG/1
10 TABLET ORAL DAILY
Qty: 90 TABLET | Refills: 1 | Status: SHIPPED | OUTPATIENT
Start: 2020-02-17 | End: 2020-11-03

## 2020-02-17 ASSESSMENT — ANXIETY QUESTIONNAIRES
GAD7 TOTAL SCORE: 2
5. BEING SO RESTLESS THAT IT IS HARD TO SIT STILL: NOT AT ALL
6. BECOMING EASILY ANNOYED OR IRRITABLE: SEVERAL DAYS
2. NOT BEING ABLE TO STOP OR CONTROL WORRYING: NOT AT ALL
7. FEELING AFRAID AS IF SOMETHING AWFUL MIGHT HAPPEN: NOT AT ALL
IF YOU CHECKED OFF ANY PROBLEMS ON THIS QUESTIONNAIRE, HOW DIFFICULT HAVE THESE PROBLEMS MADE IT FOR YOU TO DO YOUR WORK, TAKE CARE OF THINGS AT HOME, OR GET ALONG WITH OTHER PEOPLE: SOMEWHAT DIFFICULT
1. FEELING NERVOUS, ANXIOUS, OR ON EDGE: NOT AT ALL
3. WORRYING TOO MUCH ABOUT DIFFERENT THINGS: NOT AT ALL

## 2020-02-17 ASSESSMENT — PATIENT HEALTH QUESTIONNAIRE - PHQ9
SUM OF ALL RESPONSES TO PHQ QUESTIONS 1-9: 2
5. POOR APPETITE OR OVEREATING: SEVERAL DAYS

## 2020-02-17 ASSESSMENT — MIFFLIN-ST. JEOR: SCORE: 1929.16

## 2020-02-17 NOTE — PROGRESS NOTES
Subjective     Jerome Urbina is a 39 year old male who presents to clinic today for the following health issues:    HPI     Depression Follow up  Discuss switching medication. Been having diarrhea 4-5 days out of the week since he started it. He thinks that Lexapro is causing him to have symptoms of loose stools. If he eats healthy he is fine. Somedays are better than other. But if he eats eggs or cream sauces with pasta his symptoms worsen. Before starting Lexapro he had similar symptoms if he had a large greasy breakfast, but now symptoms are worsen with lighter meals like cereal. Discussed possibly follow-up with nutritionist in the near future. He denies any family history of inflammatory bowl disease or chromes diseasee . He notices stomach pain with fatty foods. Denies any nausea or vomiting symptoms. Worsened when he eats breakfast early, or greasy meals like fried eggs or ruiz. He also takes medication on an empty stomach in the morning, discussed possibly taking it at night. He has been on this medication for a years and also reports that he quit drinking and has been exercising. He is unsure if the medication is helping symptoms or if lifestyle changes have made the improvement in mood. No known family history of depression or anxiety. Depression was brought on by death of mom, he stopped drinking on the anniversary of her birthday. He has a therapist that he sees. He says that he is doing well on medication and he takes vitamin D. He stopped magnesium because he felt it made his symptoms worse. He reports that he has been exercising more and tries to get to the gym at least 3x weekly. Denies any abdominal pain, only loose stools. Blood pressure is usually in the low 120s/75s at home. His therapist said she thinks his anxiety is greater than depression currently. Discussed thyroid and celiac screening. He says he snores once in a while, but not as much as it use to be when he was drinking. Doesn't away  get good sleep at home because he has 3 kids (5 yrs, 3yrs and 18 months).     How are you doing with your depression since your last visit? improved bettter    Are you having other symptoms that might be associated with depression? No    Have you had a significant life event?  No     Are you feeling anxious or having panic attacks?   No    Do you have any concerns with your use of alcohol or other drugs? No    Social History     Tobacco Use     Smoking status: Never Smoker     Smokeless tobacco: Former User     Types: Chew     Tobacco comment: Does on social occasions, smokes a cigar or chews tobacco.   Substance Use Topics     Alcohol use: No     Comment: quit this 12/15/2018     Drug use: No     PHQ 12/21/2018 1/29/2020 2/17/2020   PHQ-9 Total Score 12 2 2   Q9: Thoughts of better off dead/self-harm past 2 weeks Several days Not at all Not at all     PRANAV-7 SCORE 12/21/2018 2/17/2020   Total Score 8 2     Last PHQ-9 2/17/2020   1.  Little interest or pleasure in doing things 0   2.  Feeling down, depressed, or hopeless 0   3.  Trouble falling or staying asleep, or sleeping too much 0   4.  Feeling tired or having little energy 1   5.  Poor appetite or overeating 1   6.  Feeling bad about yourself 0   7.  Trouble concentrating 0   8.  Moving slowly or restless 0   Q9: Thoughts of better off dead/self-harm past 2 weeks 0   PHQ-9 Total Score 2   Difficulty at work, home, or with people Not difficult at all     PRANAV-7  2/17/2020   1. Feeling nervous, anxious, or on edge 0   2. Not being able to stop or control worrying 0   3. Worrying too much about different things 0   4. Trouble relaxing 1   5. Being so restless that it is hard to sit still 0   6. Becoming easily annoyed or irritable 1   7. Feeling afraid, as if something awful might happen 0   PRANAV-7 Total Score 2   If you checked any problems, how difficult have they made it for you to do your work, take care of things at home, or get along with other people?  Somewhat difficult         Suicide Assessment Five-step Evaluation and Treatment (SAFE-T)      How many servings of fruits and vegetables do you eat daily?  2-3    On average, how many sweetened beverages do you drink each day (Examples: soda, juice, sweet tea, etc.  Do NOT count diet or artificially sweetened beverages)?   0-1    How many days per week do you miss taking your medication? 0        BP Readings from Last 3 Encounters:   02/17/20 122/78   02/01/19 129/76   12/21/18 134/86    Wt Readings from Last 3 Encounters:   02/17/20 96.6 kg (213 lb)   02/01/19 100.7 kg (222 lb)   12/21/18 102.4 kg (225 lb 12.8 oz)                    Reviewed and updated as needed this visit by Provider         Review of Systems   ROS COMP: Constitutional, HEENT, cardiovascular, pulmonary, GI, , musculoskeletal, neuro, skin, endocrine and psych systems are negative, except as otherwise noted.      This document serves as a record of the services and decisions personally performed and made by Josh Coreas DO. It was created on her behalf by Marilee Conner, a trained medical scribe. The creation of this document is based on the provider's statements to the medical scribe.  Marilee Conner 7:56 AM February 17, 2020    Objective    /78   Temp 97.9  F (36.6  C) (Oral)   Ht 1.829 m (6')   Wt 96.6 kg (213 lb)   BMI 28.89 kg/m    Body mass index is 28.89 kg/m .  Physical Exam   GENERAL: healthy, alert and no distress  EYES: Eyes grossly normal to inspection, PERRL and conjunctivae and sclerae normal  HENT: ear canals and TM's normal, nose and mouth without ulcers or lesions  NECK: no adenopathy, no asymmetry, masses, or scars and thyroid normal to palpation  RESP: lungs clear to auscultation - no rales, rhonchi or wheezes  CV: regular rate and rhythm, normal S1 S2, no S3 or S4, no murmur, click or rub, no peripheral edema and peripheral pulses strong  ABDOMEN: soft, nontender, no hepatosplenomegaly, no masses and bowel  sounds normal  MS: no gross musculoskeletal defects noted, no edema  SKIN: no suspicious lesions or rashes  NEURO: Normal strength and tone, mentation intact and speech normal  PSYCH: mentation appears normal, affect normal/bright    Diagnostic Test Results:  Labs reviewed in Epic  Results for orders placed or performed in visit on 02/17/20 (from the past 24 hour(s))   CBC with platelets and differential   Result Value Ref Range    WBC 4.5 4.0 - 11.0 10e9/L    RBC Count 4.93 4.4 - 5.9 10e12/L    Hemoglobin 14.0 13.3 - 17.7 g/dL    Hematocrit 44.6 40.0 - 53.0 %    MCV 91 78 - 100 fl    MCH 28.4 26.5 - 33.0 pg    MCHC 31.4 (L) 31.5 - 36.5 g/dL    RDW 14.0 10.0 - 15.0 %    Platelet Count 211 150 - 450 10e9/L    % Neutrophils 56.4 %    % Lymphocytes 31.4 %    % Monocytes 8.8 %    % Eosinophils 2.7 %    % Basophils 0.7 %    Absolute Neutrophil 2.6 1.6 - 8.3 10e9/L    Absolute Lymphocytes 1.4 0.8 - 5.3 10e9/L    Absolute Monocytes 0.4 0.0 - 1.3 10e9/L    Absolute Eosinophils 0.1 0.0 - 0.7 10e9/L    Absolute Basophils 0.0 0.0 - 0.2 10e9/L    Diff Method Automated Method            Assessment & Plan   1. Moderate episode of recurrent major depressive disorder (H)  Stable. Patient reports loose stools after starting Lexapro, symptoms seem to worsen in the mornings after taking medication. He is to start taking medication at night and keep a journal of his symptoms. If no improvement is observed and mood is stable, he is to taper down to 5 mg for 6-8 weeks. If symptoms persist or worsen after being on new dose for 6-8 weeks, let me know and we will discuss possibly switching to Zoloft. Patient declines flu vaccine today.   - escitalopram (LEXAPRO) 10 MG tablet; Take 1 tablet (10 mg) by mouth daily  Dispense: 90 tablet; Refill: 1  - TSH with free T4 reflex    2. Hypertension goal BP (blood pressure) < 140/90  Improved. Patient reports that blood pressure has improved since he quit drinking. He states that home blood pressure  readings average around 120/75. Rechecked CMP. Continue lifestyle changes as discussed.   - Comprehensive metabolic panel    3. Loose stools  As above. Rechecked labs. Monitor symptoms and taper down to 5 mg Lexapro if persisting symptoms. Follow-up if symptoms are not improved or resolved.   - Comprehensive metabolic panel  - Lipid panel reflex to direct LDL Fasting  - CBC with platelets and differential  - IgA  - Tissue transglutaminase linden IgA and IgG    4. Screening for thyroid disorder  Routine screening. TSH was last checked in 2013, rechecked to insure that mood disorder is not attributed to thyroid.   - TSH with free T4 reflex       See Patient InstructionsSee Patient Instructions    Return in about 8 weeks (around 4/13/2020), or if symptoms worsen or fail to improve.     The information in this document, created by the medical scribe for me, accurately reflects the services I personally performed and the decisions made by me. I have reviewed and approved this document for accuracy prior to leaving the patient care area.  February 17, 2020 7:57 AM    Josh Coreas DO  [facility]

## 2020-02-17 NOTE — PATIENT INSTRUCTIONS
I would like you to try taking medication at night as discusses and if your mood is stable and you don't see any improvement. I would like you to go down to 5 mg for about 6-8 weeks. If symptoms persist, let me know and we can discussed possible medication changes.     Keep a diary of loose stools. Let me know when you are getting it and what brings it on.

## 2020-02-17 NOTE — LETTER
"[address] [address] [phone]                                                                                                February 19, 2020    Jerome Urbina  [address] [place] [address] [address] [place] [address]        Dear Mr. Urbina,    Your cholesterol is abnormal, please use the recommendations below and recheck labs in 6-12 months.     Ways to improve your cholesterol...     1- Eats less saturated fats (including avoiding \"trans\" fats).     2 - Eat more unsaturated fats  - found in vegetables, grains, and tree nuts.   Also by replacing butter with canola oil or olive oil.     3 - Eat more nuts. 1-2 ounces (a small handful) of almonds, walnuts, hazelnuts or pecans once a day in place of other less healthy snacks.     4 - Eat more high fiber foods - vegetables and whole grains including oat bran, oats, beans, peas, and flax seed.     5 - Eat more fish - such as salmon, tuna, mackerel, and sardines.  1 or 2 six ounce servings per week is a healthy replacement for other proteins.     6 - Exercise for at least 120 minutes per week - which is equal to 30 minutes 4 days per week.       Sincerely,      Josh Coreas DO/hl    Results for orders placed or performed in visit on 02/17/20   Comprehensive metabolic panel     Status: None   Result Value Ref Range    Sodium 139 133 - 144 mmol/L    Potassium 4.7 3.4 - 5.3 mmol/L    Chloride 107 94 - 109 mmol/L    Carbon Dioxide 28 20 - 32 mmol/L    Anion Gap 4 3 - 14 mmol/L    Glucose 89 70 - 99 mg/dL    Urea Nitrogen 14 7 - 30 mg/dL    Creatinine 0.74 0.66 - 1.25 mg/dL    GFR Estimate >90 >60 mL/min/[1.73_m2]    GFR Estimate If Black >90 >60 mL/min/[1.73_m2]    Calcium 9.1 8.5 - 10.1 mg/dL    Bilirubin Total 0.6 0.2 - 1.3 mg/dL    Albumin 3.8 3.4 - 5.0 g/dL    Protein Total 7.4 6.8 - 8.8 g/dL    Alkaline Phosphatase 65 40 - 150 U/L    ALT 30 0 - 70 U/L    AST 19 0 - 45 U/L   Lipid panel reflex to direct LDL Fasting     " Status: Abnormal   Result Value Ref Range    Cholesterol 228 (H) <200 mg/dL    Triglycerides 107 <150 mg/dL    HDL Cholesterol 43 >39 mg/dL    LDL Cholesterol Calculated 164 (H) <100 mg/dL    Non HDL Cholesterol 185 (H) <130 mg/dL   CBC with platelets and differential     Status: Abnormal   Result Value Ref Range    WBC 4.5 4.0 - 11.0 10e9/L    RBC Count 4.93 4.4 - 5.9 10e12/L    Hemoglobin 14.0 13.3 - 17.7 g/dL    Hematocrit 44.6 40.0 - 53.0 %    MCV 91 78 - 100 fl    MCH 28.4 26.5 - 33.0 pg    MCHC 31.4 (L) 31.5 - 36.5 g/dL    RDW 14.0 10.0 - 15.0 %    Platelet Count 211 150 - 450 10e9/L    % Neutrophils 56.4 %    % Lymphocytes 31.4 %    % Monocytes 8.8 %    % Eosinophils 2.7 %    % Basophils 0.7 %    Absolute Neutrophil 2.6 1.6 - 8.3 10e9/L    Absolute Lymphocytes 1.4 0.8 - 5.3 10e9/L    Absolute Monocytes 0.4 0.0 - 1.3 10e9/L    Absolute Eosinophils 0.1 0.0 - 0.7 10e9/L    Absolute Basophils 0.0 0.0 - 0.2 10e9/L    Diff Method Automated Method    TSH with free T4 reflex     Status: None   Result Value Ref Range    TSH 1.39 0.40 - 4.00 mU/L   IgA     Status: None   Result Value Ref Range     84 - 499 mg/dL   Tissue transglutaminase linden IgA and IgG     Status: None   Result Value Ref Range    Tissue Transglutaminase Antibody IgA 1 <7 U/mL    Tissue Transglutaminase Linden IgG 1 <7 U/mL

## 2020-02-18 LAB
ALBUMIN SERPL-MCNC: 3.8 G/DL (ref 3.4–5)
ALP SERPL-CCNC: 65 U/L (ref 40–150)
ALT SERPL W P-5'-P-CCNC: 30 U/L (ref 0–70)
ANION GAP SERPL CALCULATED.3IONS-SCNC: 4 MMOL/L (ref 3–14)
AST SERPL W P-5'-P-CCNC: 19 U/L (ref 0–45)
BILIRUB SERPL-MCNC: 0.6 MG/DL (ref 0.2–1.3)
BUN SERPL-MCNC: 14 MG/DL (ref 7–30)
CALCIUM SERPL-MCNC: 9.1 MG/DL (ref 8.5–10.1)
CHLORIDE SERPL-SCNC: 107 MMOL/L (ref 94–109)
CHOLEST SERPL-MCNC: 228 MG/DL
CO2 SERPL-SCNC: 28 MMOL/L (ref 20–32)
CREAT SERPL-MCNC: 0.74 MG/DL (ref 0.66–1.25)
GFR SERPL CREATININE-BSD FRML MDRD: >90 ML/MIN/{1.73_M2}
GLUCOSE SERPL-MCNC: 89 MG/DL (ref 70–99)
HDLC SERPL-MCNC: 43 MG/DL
LDLC SERPL CALC-MCNC: 164 MG/DL
NONHDLC SERPL-MCNC: 185 MG/DL
POTASSIUM SERPL-SCNC: 4.7 MMOL/L (ref 3.4–5.3)
PROT SERPL-MCNC: 7.4 G/DL (ref 6.8–8.8)
SODIUM SERPL-SCNC: 139 MMOL/L (ref 133–144)
TRIGL SERPL-MCNC: 107 MG/DL
TSH SERPL DL<=0.005 MIU/L-ACNC: 1.39 MU/L (ref 0.4–4)
TTG IGA SER-ACNC: 1 U/ML
TTG IGG SER-ACNC: 1 U/ML

## 2020-02-18 ASSESSMENT — ANXIETY QUESTIONNAIRES: GAD7 TOTAL SCORE: 2

## 2020-10-26 ENCOUNTER — TRANSFERRED RECORDS (OUTPATIENT)
Dept: HEALTH INFORMATION MANAGEMENT | Facility: CLINIC | Age: 40
End: 2020-10-26

## 2020-11-02 DIAGNOSIS — F33.1 MODERATE EPISODE OF RECURRENT MAJOR DEPRESSIVE DISORDER (H): ICD-10-CM

## 2020-11-03 RX ORDER — ESCITALOPRAM OXALATE 10 MG/1
10 TABLET ORAL DAILY
Qty: 90 TABLET | Refills: 0 | Status: SHIPPED | OUTPATIENT
Start: 2020-11-03 | End: 2021-03-01

## 2020-11-16 ENCOUNTER — HEALTH MAINTENANCE LETTER (OUTPATIENT)
Age: 40
End: 2020-11-16

## 2020-12-01 ENCOUNTER — TELEPHONE (OUTPATIENT)
Dept: FAMILY MEDICINE | Facility: CLINIC | Age: 40
End: 2020-12-01

## 2020-12-01 NOTE — TELEPHONE ENCOUNTER
Patient Quality Outreach      Summary:    Patient has the following on his problem list/HM:     Depression / Dysthymia review    OVERDUE since 8/2020       PHQ-9 SCORE 12/21/2018 1/29/2020 2/17/2020   PHQ-9 Total Score MyChart - 2 (Minimal depression) -   PHQ-9 Total Score 12 2 2       If PHQ-9 recheck is 5 or more, route to provider for next steps.    Patient is due/failing the following:   PHQ-9 Needed and Depression follow-up visit and Annual wellness, date due: 2/1/2020    Type of outreach:    Sent [employer] message.    Questions for provider review:    None                                                                                                                                     Melissa Chao MA       Chart routed to Care Team.

## 2020-12-07 NOTE — TELEPHONE ENCOUNTER
Patient Quality Outreach 2nd Attempt      Summary:    Type of outreach:    Mychart read    Next Steps:  Reach out within 90 days via [employer].    Max number of attempts reached: No. Will try again in 90 days if patient still on fail list.    Questions for provider review:    None                                                                                                                    Melissa Chao MA       Chart routed to none.

## 2021-03-01 ENCOUNTER — OFFICE VISIT (OUTPATIENT)
Dept: FAMILY MEDICINE | Facility: CLINIC | Age: 41
End: 2021-03-01
Payer: COMMERCIAL

## 2021-03-01 VITALS
DIASTOLIC BLOOD PRESSURE: 76 MMHG | BODY MASS INDEX: 30.48 KG/M2 | HEART RATE: 78 BPM | SYSTOLIC BLOOD PRESSURE: 128 MMHG | WEIGHT: 225 LBS | HEIGHT: 72 IN | OXYGEN SATURATION: 99 %

## 2021-03-01 DIAGNOSIS — Z11.59 NEED FOR HEPATITIS C SCREENING TEST: ICD-10-CM

## 2021-03-01 DIAGNOSIS — Z00.00 ROUTINE GENERAL MEDICAL EXAMINATION AT A HEALTH CARE FACILITY: Primary | ICD-10-CM

## 2021-03-01 DIAGNOSIS — Z13.220 LIPID SCREENING: ICD-10-CM

## 2021-03-01 DIAGNOSIS — Z30.09 ENCOUNTER FOR VASECTOMY COUNSELING: ICD-10-CM

## 2021-03-01 DIAGNOSIS — F33.1 MODERATE EPISODE OF RECURRENT MAJOR DEPRESSIVE DISORDER (H): ICD-10-CM

## 2021-03-01 DIAGNOSIS — I10 HYPERTENSION GOAL BP (BLOOD PRESSURE) < 140/90: ICD-10-CM

## 2021-03-01 DIAGNOSIS — E78.9 ABNORMAL CHOLESTEROL TEST: ICD-10-CM

## 2021-03-01 DIAGNOSIS — Z13.1 SCREENING FOR DIABETES MELLITUS: ICD-10-CM

## 2021-03-01 PROCEDURE — 99395 PREV VISIT EST AGE 18-39: CPT | Mod: 25 | Performed by: FAMILY MEDICINE

## 2021-03-01 PROCEDURE — 96127 BRIEF EMOTIONAL/BEHAV ASSMT: CPT | Performed by: FAMILY MEDICINE

## 2021-03-01 PROCEDURE — 99213 OFFICE O/P EST LOW 20 MIN: CPT | Mod: 25 | Performed by: FAMILY MEDICINE

## 2021-03-01 PROCEDURE — 90471 IMMUNIZATION ADMIN: CPT | Performed by: FAMILY MEDICINE

## 2021-03-01 PROCEDURE — 90636 HEP A/HEP B VACC ADULT IM: CPT | Performed by: FAMILY MEDICINE

## 2021-03-01 RX ORDER — ESCITALOPRAM OXALATE 10 MG/1
15 TABLET ORAL DAILY
Qty: 135 TABLET | Refills: 1 | Status: SHIPPED | OUTPATIENT
Start: 2021-03-01 | End: 2022-05-11

## 2021-03-01 ASSESSMENT — ENCOUNTER SYMPTOMS
WEAKNESS: 0
HEMATOCHEZIA: 0
DYSURIA: 0
PALPITATIONS: 0
DIZZINESS: 0
DIARRHEA: 0
ABDOMINAL PAIN: 0
COUGH: 0
SHORTNESS OF BREATH: 0
HEARTBURN: 0
JOINT SWELLING: 0
HEADACHES: 0
ARTHRALGIAS: 0
NAUSEA: 0
FEVER: 0
EYE PAIN: 0
NERVOUS/ANXIOUS: 0
CONSTIPATION: 0
HEMATURIA: 0
MYALGIAS: 0
PARESTHESIAS: 0
FREQUENCY: 0
CHILLS: 0
SORE THROAT: 0

## 2021-03-01 ASSESSMENT — MIFFLIN-ST. JEOR: SCORE: 1978.59

## 2021-03-01 ASSESSMENT — PATIENT HEALTH QUESTIONNAIRE - PHQ9: SUM OF ALL RESPONSES TO PHQ QUESTIONS 1-9: 4

## 2021-03-01 NOTE — PATIENT INSTRUCTIONS
Increase lexapro to 15 MG  Vit D  Follow up in 6 -8 weeks here  Fasting labs then  Follow up with urology as planned    Patient Education       Preventive Health Recommendations  Male Ages 26 - 39    Yearly exam:             See your health care provider every year in order to  o   Review health changes.   o   Discuss preventive care.    o   Review your medicines if your doctor has prescribed any.    You should be tested each year for STDs (sexually transmitted diseases), if you re at risk.     After age 35, talk to your provider about cholesterol testing. If you are at risk for heart disease, have your cholesterol tested at least every 5 years.     If you are at risk for diabetes, you should have a diabetes test (fasting glucose).  Shots: Get a flu shot each year. Get a tetanus shot every 10 years.     Nutrition:    Eat at least 5 servings of fruits and vegetables daily.     Eat whole-grain bread, whole-wheat pasta and brown rice instead of white grains and rice.     Get adequate Calcium and Vitamin D.     Lifestyle    Exercise for at least 150 minutes a week (30 minutes a day, 5 days a week). This will help you control your weight and prevent disease.     Limit alcohol to one drink per day.     No smoking.     Wear sunscreen to prevent skin cancer.     See your dentist every six months for an exam and cleaning.

## 2021-03-01 NOTE — PROGRESS NOTES
SUBJECTIVE:   CC: Jerome Urbina is an 40 year old male who presents for preventative health visit.       Patient has been advised of split billing requirements and indicates understanding: Yes  Healthy Habits:     Getting at least 3 servings of Calcium per day:  Yes    Bi-annual eye exam:  Yes    Dental care twice a year:  Yes    Sleep apnea or symptoms of sleep apnea:  None    Diet:  Regular (no restrictions)    Frequency of exercise:  2-3 days/week    Duration of exercise:  15-30 minutes    Taking medications regularly:  Yes    Medication side effects:  None    PHQ-2 Total Score: 2      6 year old, 4, 2 year old at home(all girls)  He is adjusting ok'  He has not been working , layed off since chato    Depression ok-still seeing a therapist. Challenging year  With family isues  Depression Followup    How are you doing with your depression since your last visit? Might be able to be increas a bit, taking 10 MG currently    Are you having other symptoms that might be associated with depression? No    Have you had a significant life event?  No     Are you feeling anxious or having panic attacks?   Yes:  a little bit    Do you have any concerns with your use of alcohol or other drugs? No    Social History     Tobacco Use     Smoking status: Never Smoker     Smokeless tobacco: Former User     Types: Chew     Tobacco comment: Does on social occasions, smokes a cigar or chews tobacco.   Substance Use Topics     Alcohol use: No     Comment: quit this 12/15/2018     Drug use: No     PHQ 1/29/2020 2/17/2020 3/1/2021   PHQ-9 Total Score 2 2 4   Q9: Thoughts of better off dead/self-harm past 2 weeks Not at all Not at all Not at all     PRANAV-7 SCORE 12/21/2018 2/17/2020   Total Score 8 2     Last PHQ-9 3/1/2021   1.  Little interest or pleasure in doing things 1   2.  Feeling down, depressed, or hopeless 1   3.  Trouble falling or staying asleep, or sleeping too much 1   4.  Feeling tired or having little energy 1   5.   Poor appetite or overeating 0   6.  Feeling bad about yourself 0   7.  Trouble concentrating 0   8.  Moving slowly or restless 0   Q9: Thoughts of better off dead/self-harm past 2 weeks 0   PHQ-9 Total Score 4   Difficulty at work, home, or with people Not difficult at all     PRANAV-7  2/17/2020   1. Feeling nervous, anxious, or on edge 0   2. Not being able to stop or control worrying 0   3. Worrying too much about different things 0   4. Trouble relaxing 1   5. Being so restless that it is hard to sit still 0   6. Becoming easily annoyed or irritable 1   7. Feeling afraid, as if something awful might happen 0   PRANAV-7 Total Score 2   If you checked any problems, how difficult have they made it for you to do your work, take care of things at home, or get along with other people? Somewhat difficult       Suicide Assessment Five-step Evaluation and Treatment (SAFE-T)        Today's PHQ-2 Score:   PHQ-2 ( 1999 [employer]) 3/1/2021   Q1: Little interest or pleasure in doing things 1   Q2: Feeling down, depressed or hopeless 1   PHQ-2 Score 2   Q1: Little interest or pleasure in doing things Several days   Q2: Feeling down, depressed or hopeless Several days   PHQ-2 Score 2       Abuse: Current or Past(Physical, Sexual or Emotional)- No  Do you feel safe in your environment? Yes    Have you ever done Advance Care Planning? (For example, a Health Directive, POLST, or a discussion with a medical provider or your loved ones about your wishes): No, advance care planning information given to patient to review.  Advanced care planning was discussed at today's visit.    Social History     Tobacco Use     Smoking status: Never Smoker     Smokeless tobacco: Former User     Types: Chew     Tobacco comment: Does on social occasions, smokes a cigar or chews tobacco.   Substance Use Topics     Alcohol use: No     Comment: quit this 12/15/2018         Alcohol Use 3/1/2021   Prescreen: >3 drinks/day or >7 drinks/week? Not Applicable    Prescreen: >3 drinks/day or >7 drinks/week? -       Last PSA: No results found for: PSA    Reviewed orders with patient. Reviewed health maintenance and updated orders accordingly - Yes  BP Readings from Last 3 Encounters:   03/01/21 128/76   02/17/20 122/78   02/01/19 129/76    Wt Readings from Last 3 Encounters:   03/01/21 102.1 kg (225 lb)   02/17/20 96.6 kg (213 lb)   02/01/19 100.7 kg (222 lb)                    Reviewed and updated as needed this visit by clinical staff  Tobacco  Allergies  Meds              Reviewed and updated as needed this visit by Provider                Past Medical History:   Diagnosis Date     Hypertension       Past Surgical History:   Procedure Laterality Date     SURGICAL HISTORY OF -   5/1/00    Right Shoulder Arthroscopy  (Dr. Pappas @ [facility])     OB History   No obstetric history on file.       Review of Systems   Constitutional: Negative for chills and fever.   HENT: Negative for congestion, ear pain, hearing loss and sore throat.    Eyes: Negative for pain and visual disturbance.   Respiratory: Negative for cough and shortness of breath.    Cardiovascular: Negative for chest pain, palpitations and peripheral edema.   Gastrointestinal: Negative for abdominal pain, constipation, diarrhea, heartburn, hematochezia and nausea.   Genitourinary: Negative for discharge, dysuria, frequency, genital sores, hematuria, impotence and urgency.   Musculoskeletal: Negative for arthralgias, joint swelling and myalgias.   Skin: Negative for rash.   Neurological: Negative for dizziness, weakness, headaches and paresthesias.   Psychiatric/Behavioral: Positive for mood changes. The patient is not nervous/anxious.      CONSTITUTIONAL: NEGATIVE for fever, chills, change in weight  INTEGUMENTARY/SKIN: NEGATIVE for worrisome rashes, moles or lesions  EYES: NEGATIVE for vision changes or irritation  ENT: NEGATIVE for ear, mouth and throat problems  RESP: NEGATIVE for significant  cough or SOB  CV: NEGATIVE for chest pain, palpitations or peripheral edema  GI: NEGATIVE for nausea, abdominal pain, heartburn, or change in bowel habits   male: negative for dysuria, hematuria, decreased urinary stream, erectile dysfunction, urethral discharge  MUSCULOSKELETAL: NEGATIVE for significant arthralgias or myalgia  NEURO: NEGATIVE for weakness, dizziness or paresthesias  PSYCHIATRIC: NEGATIVE for changes in mood or affect    OBJECTIVE:   /76   Pulse 78   Ht 1.829 m (6')   Wt 102.1 kg (225 lb)   SpO2 99%   BMI 30.52 kg/m      Physical Exam  GENERAL: healthy, alert and no distress  EYES: Eyes grossly normal to inspection, PERRL and conjunctivae and sclerae normal  HENT: ear canals and TM's normal, nose and mouth without ulcers or lesions  NECK: no adenopathy, no asymmetry, masses, or scars and thyroid normal to palpation  RESP: lungs clear to auscultation - no rales, rhonchi or wheezes  CV: regular rate and rhythm, normal S1 S2, no S3 or S4, no murmur, click or rub, no peripheral edema and peripheral pulses strong  ABDOMEN: soft, nontender, no hepatosplenomegaly, no masses and bowel sounds normal  MS: no gross musculoskeletal defects noted, no edema  SKIN: no suspicious lesions or rashes  NEURO: Normal strength and tone, mentation intact and speech normal  PSYCH: mentation appears normal, affect normal/bright    Diagnostic Test Results:  Labs reviewed in Epic    ASSESSMENT/PLAN:       ICD-10-CM    1. Routine general medical examination at a health care facility  Z00.00    2. Moderate episode of recurrent major depressive disorder (H)  F33.1 escitalopram (LEXAPRO) 10 MG tablet   3. Need for hepatitis C screening test  Z11.59 Hepatitis C antibody   4. Hypertension goal BP (blood pressure) < 140/90  I10    5. Lipid screening  Z13.220 Lipid panel reflex to direct LDL Fasting   6. Screening for diabetes mellitus  Z13.1 Basic metabolic panel   7. Encounter for vasectomy counseling  Z30.09 UROLOGY  ADULT REFERRAL   8. BMI 30.0-30.9,adult  Z68.30    9. Abnormal cholesterol test  E78.9      Depression-worsening due to covid times and other stressors-Increase lexapro to 15 MG, continue to monitor  Add Vit D, mag  Follow up in 6 -8 weeks here  abnormal cholesterol-reviewed labs, advised exercise, follow up for repeat labs    Fasting labs at that time   He is requesting to go to [place] [facility] for his vasectomy, already made an appoint, referral placed-Follow up with urology as planned    Patient has been advised of split billing requirements and indicates understanding: Yes  COUNSELING:   Reviewed preventive health counseling, as reflected in patient instructions    Estimated body mass index is 30.52 kg/m  as calculated from the following:    Height as of this encounter: 1.829 m (6').    Weight as of this encounter: 102.1 kg (225 lb).     Weight management plan: Discussed healthy diet and exercise guidelines    He reports that he has never smoked. He quit smokeless tobacco use about 13 years ago.  His smokeless tobacco use included chew.      Counseling Resources:  ATP IV Guidelines  Pooled Cohorts Equation Calculator  FRAX Risk Assessment  ICSI Preventive Guidelines  Dietary Guidelines for Americans, 2010  USDA's MyPlate  ASA Prophylaxis  Lung CA Screening    DO KONSTANTIN [facility]

## 2021-03-03 ENCOUNTER — TRANSFERRED RECORDS (OUTPATIENT)
Dept: HEALTH INFORMATION MANAGEMENT | Facility: CLINIC | Age: 41
End: 2021-03-03

## 2021-03-26 ENCOUNTER — TRANSFERRED RECORDS (OUTPATIENT)
Dept: HEALTH INFORMATION MANAGEMENT | Facility: CLINIC | Age: 41
End: 2021-03-26

## 2021-09-18 ENCOUNTER — HEALTH MAINTENANCE LETTER (OUTPATIENT)
Age: 41
End: 2021-09-18

## 2021-09-23 ENCOUNTER — LAB (OUTPATIENT)
Dept: LAB | Facility: CLINIC | Age: 41
End: 2021-09-23
Payer: COMMERCIAL

## 2021-09-23 DIAGNOSIS — Z13.1 SCREENING FOR DIABETES MELLITUS: ICD-10-CM

## 2021-09-23 DIAGNOSIS — Z13.220 LIPID SCREENING: ICD-10-CM

## 2021-09-23 DIAGNOSIS — Z11.59 NEED FOR HEPATITIS C SCREENING TEST: ICD-10-CM

## 2021-09-23 LAB
ANION GAP SERPL CALCULATED.3IONS-SCNC: 5 MMOL/L (ref 3–14)
BUN SERPL-MCNC: 12 MG/DL (ref 7–30)
CALCIUM SERPL-MCNC: 8.5 MG/DL (ref 8.5–10.1)
CHLORIDE BLD-SCNC: 109 MMOL/L (ref 94–109)
CHOLEST SERPL-MCNC: 212 MG/DL
CO2 SERPL-SCNC: 26 MMOL/L (ref 20–32)
CREAT SERPL-MCNC: 0.78 MG/DL (ref 0.66–1.25)
FASTING STATUS PATIENT QL REPORTED: YES
GFR SERPL CREATININE-BSD FRML MDRD: >90 ML/MIN/1.73M2
GLUCOSE BLD-MCNC: 94 MG/DL (ref 70–99)
HCV AB SERPL QL IA: NONREACTIVE
HDLC SERPL-MCNC: 46 MG/DL
HOLD SPECIMEN: NORMAL
LDLC SERPL CALC-MCNC: 142 MG/DL
NONHDLC SERPL-MCNC: 166 MG/DL
POTASSIUM BLD-SCNC: 4.3 MMOL/L (ref 3.4–5.3)
SODIUM SERPL-SCNC: 140 MMOL/L (ref 133–144)
TRIGL SERPL-MCNC: 120 MG/DL

## 2021-09-23 PROCEDURE — 86803 HEPATITIS C AB TEST: CPT

## 2021-09-23 PROCEDURE — 36415 COLL VENOUS BLD VENIPUNCTURE: CPT

## 2021-09-23 PROCEDURE — 80048 BASIC METABOLIC PNL TOTAL CA: CPT

## 2021-09-23 PROCEDURE — 80061 LIPID PANEL: CPT

## 2021-09-23 NOTE — LETTER
"September 27, 2021      Jerome Urbina  [address] [place] [place] [address]        Dear Jerome,     Your cholesterol is abnormal, please use the recommendations below and recheck labs in 6-12 months.     Ways to improve your cholesterol...     1- Eats less saturated fats (including avoiding \"trans\" fats).     2 - Eat more unsaturated fats  - found in vegetables, grains, and tree nuts.   Also by replacing butter with canola oil or olive oil.     3 - Eat more nuts. 1-2 ounces (a small handful) of almonds, walnuts, hazelnuts or pecans once a day in place of other less healthy snacks.     4 - Eat more high fiber foods - vegetables and whole grains including oat bran, oats, beans, peas, and flax seed.     5 - Eat more fish - such as salmon, tuna, mackerel, and sardines.  1 or 2 six ounce servings per week is a healthy replacement for other proteins.     6 - Exercise for at least 120 minutes per week - which is equal to 30 minutes 4 days per week.      Take [employer]iva,     Josh Coreas D.O.    Results for orders placed or performed in visit on 09/23/21   Lipid panel reflex to direct LDL Fasting     Status: Abnormal   Result Value Ref Range    Cholesterol 212 (H) <200 mg/dL    Triglycerides 120 <150 mg/dL    Direct Measure HDL 46 >=40 mg/dL    LDL Cholesterol Calculated 142 (H) <=100 mg/dL    Non HDL Cholesterol 166 (H) <130 mg/dL    Patient Fasting > 8hrs? Yes     Narrative    Cholesterol  Desirable:  <200 mg/dL    Triglycerides  Normal:  Less than 150 mg/dL  Borderline High:  150-199 mg/dL  High:  200-499 mg/dL  Very High:  Greater than or equal to 500 mg/dL    Direct Measure HDL  Female:  Greater than or equal to 50 mg/dL   Male:  Greater than or equal to 40 mg/dL    LDL Cholesterol  Desirable:  <100mg/dL  Above Desirable:  100-129 mg/dL   Borderline High:  130-159 mg/dL   High:  160-189 mg/dL   Very High:  >= 190 mg/dL    Non HDL Cholesterol  Desirable:  130 mg/dL  Above Desirable:  130-159 mg/dL  Borderline High:  " 160-189 mg/dL  High:  190-219 mg/dL  Very High:  Greater than or equal to 220 mg/dL   Basic metabolic panel     Status: Normal   Result Value Ref Range    Sodium 140 133 - 144 mmol/L    Potassium 4.3 3.4 - 5.3 mmol/L    Chloride 109 94 - 109 mmol/L    Carbon Dioxide (CO2) 26 20 - 32 mmol/L    Anion Gap 5 3 - 14 mmol/L    Urea Nitrogen 12 7 - 30 mg/dL    Creatinine 0.78 0.66 - 1.25 mg/dL    Calcium 8.5 8.5 - 10.1 mg/dL    Glucose 94 70 - 99 mg/dL    GFR Estimate >90 >60 mL/min/1.73m2   Hepatitis C antibody     Status: Normal   Result Value Ref Range    Hepatitis C Antibody Nonreactive Nonreactive    Narrative    Assay performance characteristics have not been established for newborns, infants, and children.   Extra Purple Top Tube     Status: None   Result Value Ref Range    Hold Specimen [facility]    Extra Tube     Status: None    Narrative    The following orders were created for panel order Extra Tube.  Procedure                               Abnormality         Status                     ---------                               -----------         ------                     Extra Purple Top Tube[[id number]]                            Final result                 Please view results for these tests on the individual orders.                       [facility]

## 2021-09-25 ENCOUNTER — TRANSFERRED RECORDS (OUTPATIENT)
Dept: HEALTH INFORMATION MANAGEMENT | Facility: CLINIC | Age: 41
End: 2021-09-25

## 2022-01-01 ENCOUNTER — TRANSFERRED RECORDS (OUTPATIENT)
Dept: HEALTH INFORMATION MANAGEMENT | Facility: CLINIC | Age: 42
End: 2022-01-01
Payer: COMMERCIAL

## 2022-01-27 DIAGNOSIS — F33.1 MODERATE EPISODE OF RECURRENT MAJOR DEPRESSIVE DISORDER (H): ICD-10-CM

## 2022-01-27 RX ORDER — ESCITALOPRAM OXALATE 10 MG/1
15 TABLET ORAL DAILY
Qty: 135 TABLET | Refills: 1 | OUTPATIENT
Start: 2022-01-27 | End: 2022-04-27

## 2022-01-27 NOTE — TELEPHONE ENCOUNTER
Overdue for visit, 6 month supply last given 3/2021 - should have been out of medication 9/2021. Mychart sent.     Sahra Tracey, RN, BSN, PHN  [facility]: [place]

## 2022-03-02 ENCOUNTER — MYC MEDICAL ADVICE (OUTPATIENT)
Dept: FAMILY MEDICINE | Facility: CLINIC | Age: 42
End: 2022-03-02
Payer: COMMERCIAL

## 2022-03-02 ENCOUNTER — TELEPHONE (OUTPATIENT)
Dept: FAMILY MEDICINE | Facility: CLINIC | Age: 42
End: 2022-03-02
Payer: COMMERCIAL

## 2022-03-02 DIAGNOSIS — B00.1 COLD SORE: Primary | ICD-10-CM

## 2022-03-02 RX ORDER — ACYCLOVIR 200 MG/1
200 CAPSULE ORAL
Qty: 25 CAPSULE | Refills: 0 | Status: SHIPPED | OUTPATIENT
Start: 2022-03-02 | End: 2022-04-19

## 2022-03-02 NOTE — TELEPHONE ENCOUNTER
Patient Quality Outreach    Patient is due for the following:   Hypertension -  Hypertension follow-up visit  Depression  -  PHQ-9 Needed  Physical  - Due after 3/1/22    NEXT STEPS:   Schedule a yearly physical    Type of outreach:    Sent Spark message.      Questions for provider review:    None     Melissa Chao, CECILIA

## 2022-03-02 NOTE — TELEPHONE ENCOUNTER
Sent mychart for patient to make Physical.     , did you want to sign the medication? It is still pended.     Melissa Chao MA

## 2022-03-02 NOTE — TELEPHONE ENCOUNTER
Routing to PCP- see sanjeev alexander    You have not see pt since 3/2021. Are you okay prescribing acyclovir    Rx pending approval if appropriate and can let pt know is due for annual visit.     Deidre Copeland RN

## 2022-03-21 ENCOUNTER — TRANSFERRED RECORDS (OUTPATIENT)
Dept: HEALTH INFORMATION MANAGEMENT | Facility: CLINIC | Age: 42
End: 2022-03-21
Payer: COMMERCIAL

## 2022-04-19 ENCOUNTER — MYC MEDICAL ADVICE (OUTPATIENT)
Dept: FAMILY MEDICINE | Facility: CLINIC | Age: 42
End: 2022-04-19
Payer: COMMERCIAL

## 2022-04-19 DIAGNOSIS — B00.1 COLD SORE: ICD-10-CM

## 2022-04-19 RX ORDER — ACYCLOVIR 200 MG/1
200 CAPSULE ORAL
Qty: 25 CAPSULE | Refills: 0 | Status: SHIPPED | OUTPATIENT
Start: 2022-04-19 | End: 2022-05-11

## 2022-04-19 NOTE — TELEPHONE ENCOUNTER
Routing to PCP- see sanjeev alexander    Looks like pt was seen last year 3/1/21. Okay to refill cold sore med?    Should pt schedule annual visit?    Rx pending approval if appropriate    Deidre Copeland RN

## 2022-04-24 ENCOUNTER — HEALTH MAINTENANCE LETTER (OUTPATIENT)
Age: 42
End: 2022-04-24

## 2022-05-11 ENCOUNTER — OFFICE VISIT (OUTPATIENT)
Dept: FAMILY MEDICINE | Facility: CLINIC | Age: 42
End: 2022-05-11
Payer: COMMERCIAL

## 2022-05-11 VITALS
SYSTOLIC BLOOD PRESSURE: 130 MMHG | HEIGHT: 72 IN | HEART RATE: 71 BPM | DIASTOLIC BLOOD PRESSURE: 76 MMHG | WEIGHT: 227 LBS | RESPIRATION RATE: 16 BRPM | BODY MASS INDEX: 30.75 KG/M2 | TEMPERATURE: 98 F | OXYGEN SATURATION: 99 %

## 2022-05-11 DIAGNOSIS — I10 HYPERTENSION GOAL BP (BLOOD PRESSURE) < 140/90: ICD-10-CM

## 2022-05-11 DIAGNOSIS — G43.809 OTHER MIGRAINE WITHOUT STATUS MIGRAINOSUS, NOT INTRACTABLE: ICD-10-CM

## 2022-05-11 DIAGNOSIS — F33.1 MODERATE EPISODE OF RECURRENT MAJOR DEPRESSIVE DISORDER (H): ICD-10-CM

## 2022-05-11 DIAGNOSIS — B00.1 COLD SORE: ICD-10-CM

## 2022-05-11 DIAGNOSIS — Z00.00 ROUTINE GENERAL MEDICAL EXAMINATION AT A HEALTH CARE FACILITY: Primary | ICD-10-CM

## 2022-05-11 DIAGNOSIS — E78.9 ABNORMAL CHOLESTEROL TEST: ICD-10-CM

## 2022-05-11 DIAGNOSIS — H53.9 VISION CHANGES: ICD-10-CM

## 2022-05-11 PROCEDURE — 99214 OFFICE O/P EST MOD 30 MIN: CPT | Mod: 25 | Performed by: FAMILY MEDICINE

## 2022-05-11 PROCEDURE — 96127 BRIEF EMOTIONAL/BEHAV ASSMT: CPT | Mod: 59 | Performed by: FAMILY MEDICINE

## 2022-05-11 PROCEDURE — 99395 PREV VISIT EST AGE 18-39: CPT | Performed by: FAMILY MEDICINE

## 2022-05-11 RX ORDER — ESCITALOPRAM OXALATE 10 MG/1
15 TABLET ORAL DAILY
Qty: 135 TABLET | Refills: 1 | Status: CANCELLED | OUTPATIENT
Start: 2022-05-11

## 2022-05-11 RX ORDER — ACYCLOVIR 200 MG/1
200 CAPSULE ORAL
Qty: 25 CAPSULE | Refills: 3 | Status: SHIPPED | OUTPATIENT
Start: 2022-05-11

## 2022-05-11 RX ORDER — SUMATRIPTAN 50 MG/1
50 TABLET, FILM COATED ORAL
Qty: 15 TABLET | Refills: 1 | Status: SHIPPED | OUTPATIENT
Start: 2022-05-11

## 2022-05-11 RX ORDER — ESCITALOPRAM OXALATE 20 MG/1
20 TABLET ORAL DAILY
Qty: 90 TABLET | Refills: 1 | Status: SHIPPED | OUTPATIENT
Start: 2022-05-11 | End: 2022-11-03

## 2022-05-11 RX ORDER — ACYCLOVIR 200 MG/1
200 CAPSULE ORAL
Qty: 25 CAPSULE | Refills: 0 | Status: SHIPPED | OUTPATIENT
Start: 2022-05-11 | End: 2022-05-11

## 2022-05-11 ASSESSMENT — PATIENT HEALTH QUESTIONNAIRE - PHQ9
SUM OF ALL RESPONSES TO PHQ QUESTIONS 1-9: 3
10. IF YOU CHECKED OFF ANY PROBLEMS, HOW DIFFICULT HAVE THESE PROBLEMS MADE IT FOR YOU TO DO YOUR WORK, TAKE CARE OF THINGS AT HOME, OR GET ALONG WITH OTHER PEOPLE: NOT DIFFICULT AT ALL
SUM OF ALL RESPONSES TO PHQ QUESTIONS 1-9: 3

## 2022-05-11 ASSESSMENT — ANXIETY QUESTIONNAIRES
4. TROUBLE RELAXING: NOT AT ALL
3. WORRYING TOO MUCH ABOUT DIFFERENT THINGS: NOT AT ALL
7. FEELING AFRAID AS IF SOMETHING AWFUL MIGHT HAPPEN: NOT AT ALL
2. NOT BEING ABLE TO STOP OR CONTROL WORRYING: NOT AT ALL
7. FEELING AFRAID AS IF SOMETHING AWFUL MIGHT HAPPEN: NOT AT ALL
5. BEING SO RESTLESS THAT IT IS HARD TO SIT STILL: NOT AT ALL
1. FEELING NERVOUS, ANXIOUS, OR ON EDGE: NOT AT ALL
GAD7 TOTAL SCORE: 1
8. IF YOU CHECKED OFF ANY PROBLEMS, HOW DIFFICULT HAVE THESE MADE IT FOR YOU TO DO YOUR WORK, TAKE CARE OF THINGS AT HOME, OR GET ALONG WITH OTHER PEOPLE?: NOT DIFFICULT AT ALL
6. BECOMING EASILY ANNOYED OR IRRITABLE: SEVERAL DAYS
GAD7 TOTAL SCORE: 1
GAD7 TOTAL SCORE: 1

## 2022-05-11 ASSESSMENT — ENCOUNTER SYMPTOMS
WEAKNESS: 0
EYE PAIN: 0
JOINT SWELLING: 0
ARTHRALGIAS: 0
FEVER: 0
DIARRHEA: 0
CONSTIPATION: 0
HEARTBURN: 0
HEMATOCHEZIA: 0
HEMATURIA: 0
NERVOUS/ANXIOUS: 0
DIZZINESS: 0
PALPITATIONS: 0
DYSURIA: 0
SHORTNESS OF BREATH: 0
COUGH: 0
PARESTHESIAS: 0
SORE THROAT: 0
MYALGIAS: 0
NAUSEA: 0
ABDOMINAL PAIN: 0
FREQUENCY: 0
HEADACHES: 1
CHILLS: 0

## 2022-05-11 ASSESSMENT — PAIN SCALES - GENERAL: PAINLEVEL: NO PAIN (0)

## 2022-05-11 NOTE — PROGRESS NOTES
SUBJECTIVE:   CC: Jerome Urbina is an 41 year old male who presents for preventative health visit.       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 Occurring every couple weeks. otc medications help a little. Usually lays down and it helps. Light and sound sensitive at times.   Tried to go off of  lexapro for about three weeks and restarted once he wasn't feeling as stable. Every couple of weeks off and on. Start the back of neck and go to front, has not seen eye care providers, no eye trauma, gets better with ibuprofen  Coffee in am, lots off water, he feels like when he does not drink water is when he gets ha  32 oz maybe 4 times      History of bad headache -migraine-worked up in ED and everything was fine and this is not as bad    Some sleep issues recently.       High chlesterol-eating lots of fish, not too much diary, he does eat eggs , cheese    PHQ 3/1/2021 3/2/2022 5/11/2022   PHQ-9 Total Score 4 1 3   Q9: Thoughts of better off dead/self-harm past 2 weeks Not at all Not at all Not at all       Depression and Anxiety Follow-Up    How are you doing with your depression since your last visit? No change    How are you doing with your anxiety since your last visit?  No change    Are you having other symptoms that might be associated with  depression or anxiety? Sleep issues and headaches    Have you had a significant life event? No     Do you have any concerns with your use of alcohol or other drugs? No    Social History     Tobacco Use     Smoking status: Never Smoker     Smokeless tobacco: Former User     Types: Chew     Quit date: 3/16/2007     Tobacco comment: Does on social occasions, smokes a cigar or chews tobacco.   Vaping Use     Vaping Use: Never used   Substance Use Topics     Alcohol use: No     Comment: quit this 12/15/2018     Drug use: No     PHQ 3/1/2021 3/2/2022 5/11/2022   PHQ-9 Total Score 4 1 3   Q9: Thoughts of better off dead/self-harm past 2 weeks Not at all Not at all Not at all     PRANAV-7 SCORE 12/21/2018 2/17/2020 5/11/2022   Total Score - - 1 (minimal anxiety)   Total Score 8 2 1     Last PHQ-9 5/11/2022   1.  Little interest or pleasure in doing things 0   2.  Feeling down, depressed, or hopeless 0   3.  Trouble falling or staying asleep, or sleeping too much 1   4.  Feeling tired or having little energy 1   5.  Poor appetite or overeating 1   6.  Feeling bad about yourself 0   7.  Trouble concentrating 0   8.  Moving slowly or restless 0   Q9: Thoughts of better off dead/self-harm past 2 weeks 0   PHQ-9 Total Score 3   Difficulty at work, home, or with people -     PRANAV-7  5/11/2022   1. Feeling nervous, anxious, or on edge 0   2. Not being able to stop or control worrying 0   3. Worrying too much about different things 0   4. Trouble relaxing 0   5. Being so restless that it is hard to sit still 0   6. Becoming easily annoyed or irritable 1   7. Feeling afraid, as if something awful might happen 0   PRANAV-7 Total Score 1   If you checked any problems, how difficult have they made it for you to do your work, take care of things at home, or get along with other people? -       Suicide Assessment Five-step Evaluation and Treatment (SAFE-T)      Today's PHQ-2 Score:   PHQ-2 ( 1999 [employer]) 5/11/2022   Q1: Little interest or  pleasure in doing things 0   Q2: Feeling down, depressed or hopeless 0   PHQ-2 Score 0   PHQ-2 Total Score (12-17 Years)- Positive if 3 or more points; Administer PHQ-A if positive -   Q1: Little interest or pleasure in doing things Not at all   Q2: Feeling down, depressed or hopeless Not at all   PHQ-2 Score 0       Abuse: Current or Past(Physical, Sexual or Emotional)- No  Do you feel safe in your environment? Yes        Social History     Tobacco Use     Smoking status: Never Smoker     Smokeless tobacco: Former User     Types: Chew     Quit date: 3/16/2007     Tobacco comment: Does on social occasions, smokes a cigar or chews tobacco.   Substance Use Topics     Alcohol use: No     Comment: quit this 12/15/2018         Alcohol Use 5/11/2022   Prescreen: >3 drinks/day or >7 drinks/week? Not Applicable   Prescreen: >3 drinks/day or >7 drinks/week? -       Last PSA: No results found for: PSA    Reviewed orders with patient. Reviewed health maintenance and updated orders accordingly - Yes  BP Readings from Last 3 Encounters:   05/11/22 130/76   03/01/21 128/76   02/17/20 122/78    Wt Readings from Last 3 Encounters:   05/11/22 103 kg (227 lb)   03/01/21 102.1 kg (225 lb)   02/17/20 96.6 kg (213 lb)                    Reviewed and updated as needed this visit by clinical staff   Tobacco   Meds   Med Hx  Surg Hx  Fam Hx  Soc Hx          Reviewed and updated as needed this visit by Provider                   Past Medical History:   Diagnosis Date     Hypertension       Past Surgical History:   Procedure Laterality Date     SURGICAL HISTORY OF -   5/1/00    Right Shoulder Arthroscopy  (Dr. Pappas @ [facility])     OB History   No obstetric history on file.       Review of Systems   Constitutional: Negative for chills and fever.   HENT: Negative for congestion, ear pain, hearing loss and sore throat.    Eyes: Negative for pain and visual disturbance.   Respiratory: Negative for cough and shortness of  breath.    Cardiovascular: Negative for chest pain, palpitations and peripheral edema.   Gastrointestinal: Negative for abdominal pain, constipation, diarrhea, heartburn, hematochezia and nausea.   Genitourinary: Negative for dysuria, frequency, genital sores, hematuria and urgency.   Musculoskeletal: Negative for arthralgias, joint swelling and myalgias.   Skin: Negative for rash.   Neurological: Positive for headaches. Negative for dizziness, weakness and paresthesias.   Psychiatric/Behavioral: Negative for mood changes. The patient is not nervous/anxious.      CONSTITUTIONAL: NEGATIVE for fever, chills, change in weight  INTEGUMENTARY/SKIN: NEGATIVE for worrisome rashes, moles or lesions  EYES: NEGATIVE for vision changes or irritation  ENT: NEGATIVE for ear, mouth and throat problems  RESP: NEGATIVE for significant cough or SOB  CV: NEGATIVE for chest pain, palpitations or peripheral edema  GI: NEGATIVE for nausea, abdominal pain, heartburn, or change in bowel habits   male: negative for dysuria, hematuria, decreased urinary stream, erectile dysfunction, urethral discharge  MUSCULOSKELETAL: NEGATIVE for significant arthralgias or myalgia  NEURO: NEGATIVE for weakness, dizziness or paresthesias  PSYCHIATRIC: NEGATIVE for changes in mood or affect    OBJECTIVE:   /76   Pulse 71   Temp 98  F (36.7  C) (Oral)   Resp 16   Ht 1.829 m (6')   Wt 103 kg (227 lb)   SpO2 99%   BMI 30.79 kg/m      Physical Exam  GENERAL: healthy, alert and no distress  EYES: Eyes grossly normal to inspection, PERRL and conjunctivae and sclerae normal  HENT: ear canals and TM's normal, nose and mouth without ulcers or lesions  NECK: no adenopathy, no asymmetry, masses, or scars and thyroid normal to palpation  RESP: lungs clear to auscultation - no rales, rhonchi or wheezes  CV: regular rate and rhythm, normal S1 S2, no S3 or S4, no murmur, click or rub, no peripheral edema and peripheral pulses strong  ABDOMEN: soft,  nontender, no hepatosplenomegaly, no masses and bowel sounds normal  MS: no gross musculoskeletal defects noted, no edema  SKIN: no suspicious lesions or rashes  NEURO: Normal strength and tone, mentation intact and speech normal  PSYCH: mentation appears normal, affect normal/bright    Diagnostic Test Results:  Labs reviewed in Epic    ASSESSMENT/PLAN:       ICD-10-CM    1. Routine general medical examination at a health care facility  Z00.00 CBC with platelets and differential   2. Cold sore  B00.1 acyclovir (ZOVIRAX) 200 MG capsule     DISCONTINUED: acyclovir (ZOVIRAX) 200 MG capsule   3. Moderate episode of recurrent major depressive disorder (H)  F33.1 escitalopram (LEXAPRO) 20 MG tablet   4. Hypertension goal BP (blood pressure) < 140/90  I10 Lipid panel reflex to direct LDL Fasting     Comprehensive metabolic panel (BMP + Alb, Alk Phos, ALT, AST, Total. Bili, TP)   5. Abnormal cholesterol test  E78.9 Lipid panel reflex to direct LDL Fasting     Comprehensive metabolic panel (BMP + Alb, Alk Phos, ALT, AST, Total. Bili, TP)   6. Other migraine without status migrainosus, not intractable  G43.809 SUMAtriptan (IMITREX) 50 MG tablet     Adult Eye Referral   7. Vision changes  H53.9 Adult Eye Referral   depression-was slightly off, increased to 20mg , as he was taking off and on 15/10 mg as it is hard to cut pills, close monitoring    Recent headaches-sound migrainous, advsied close monitoring, not the worse headache, use Imitrex as needed, if not resolving follow up here in 4-5 weeks  Keep a diary of pain , food, mood etc  Go see eye doctor  If doing well follow up in 6 months    Elevated bp/htn-not on med, diet controlled, per patient at home is low 100/80's close monitoring    Diet and exercise as discussed  Check bp at home    History of elevated cholesterol-dietary changes, recheck fasting labs    Cold sores-stable ,refilled acycovir        Patient has been advised of split billing requirements and indicates  understanding: Yes    COUNSELING:   Reviewed preventive health counseling, as reflected in patient instructions    Estimated body mass index is 30.79 kg/m  as calculated from the following:    Height as of this encounter: 1.829 m (6').    Weight as of this encounter: 103 kg (227 lb).     Weight management plan: Discussed healthy diet and exercise guidelines    He reports that he has never smoked. He quit smokeless tobacco use about 15 years ago.  His smokeless tobacco use included chew.      Counseling Resources:  ATP IV Guidelines  Pooled Cohorts Equation Calculator  FRAX Risk Assessment  ICSI Preventive Guidelines  Dietary Guidelines for Americans, 2010  USDA's MyPlate  ASA Prophylaxis  Lung CA Screening    Josh Coreas DO  [facility]  Answers for HPI/ROS submitted by the patient on 5/11/2022  If you checked off any problems, how difficult have these problems made it for you to do your work, take care of things at home, or get along with other people?: Not difficult at all  PHQ9 TOTAL SCORE: 3  PRANAV 7 TOTAL SCORE: 1

## 2022-05-11 NOTE — PATIENT INSTRUCTIONS
Fasting labs to be done soon  Close monitoring of mood  Headache, use imitrex as needed for pain  If not resolving follow up   Keep a diary of pain , food, mood etc  Go see eye doctor  If doing well follow up in 6 months    Diet and exercise as discussed  Josh Coreas D.O.      Patient Education     Preventive Health Recommendations  Male Ages 26 - 39    Yearly exam:             See your health care provider every year in order to  o   Review health changes.   o   Discuss preventive care.    o   Review your medicines if your doctor has prescribed any.  You should be tested each year for STDs (sexually transmitted diseases), if you re at risk.   After age 35, talk to your provider about cholesterol testing. If you are at risk for heart disease, have your cholesterol tested at least every 5 years.   If you are at risk for diabetes, you should have a diabetes test (fasting glucose).  Shots: Get a flu shot each year. Get a tetanus shot every 10 years.     Nutrition:  Eat at least 5 servings of fruits and vegetables daily.   Eat whole-grain bread, whole-wheat pasta and brown rice instead of white grains and rice.   Get adequate Calcium and Vitamin D.     Lifestyle  Exercise for at least 150 minutes a week (30 minutes a day, 5 days a week). This will help you control your weight and prevent disease.   Limit alcohol to one drink per day.   No smoking.   Wear sunscreen to prevent skin cancer.   See your dentist every six months for an exam and cleaning.

## 2022-05-12 ASSESSMENT — ANXIETY QUESTIONNAIRES: GAD7 TOTAL SCORE: 1

## 2022-05-12 ASSESSMENT — PATIENT HEALTH QUESTIONNAIRE - PHQ9: SUM OF ALL RESPONSES TO PHQ QUESTIONS 1-9: 3

## 2022-06-24 ENCOUNTER — LAB (OUTPATIENT)
Dept: LAB | Facility: CLINIC | Age: 42
End: 2022-06-24
Payer: COMMERCIAL

## 2022-06-24 DIAGNOSIS — I10 HYPERTENSION GOAL BP (BLOOD PRESSURE) < 140/90: ICD-10-CM

## 2022-06-24 DIAGNOSIS — Z00.00 ROUTINE GENERAL MEDICAL EXAMINATION AT A HEALTH CARE FACILITY: ICD-10-CM

## 2022-06-24 DIAGNOSIS — E78.9 ABNORMAL CHOLESTEROL TEST: ICD-10-CM

## 2022-06-24 LAB
BASOPHILS # BLD AUTO: 0 10E3/UL (ref 0–0.2)
BASOPHILS NFR BLD AUTO: 0 %
EOSINOPHIL # BLD AUTO: 0.1 10E3/UL (ref 0–0.7)
EOSINOPHIL NFR BLD AUTO: 2 %
ERYTHROCYTE [DISTWIDTH] IN BLOOD BY AUTOMATED COUNT: 13.5 % (ref 10–15)
HCT VFR BLD AUTO: 44.9 % (ref 40–53)
HGB BLD-MCNC: 14.7 G/DL (ref 13.3–17.7)
LYMPHOCYTES # BLD AUTO: 1.8 10E3/UL (ref 0.8–5.3)
LYMPHOCYTES NFR BLD AUTO: 30 %
MCH RBC QN AUTO: 28.9 PG (ref 26.5–33)
MCHC RBC AUTO-ENTMCNC: 32.7 G/DL (ref 31.5–36.5)
MCV RBC AUTO: 88 FL (ref 78–100)
MONOCYTES # BLD AUTO: 0.6 10E3/UL (ref 0–1.3)
MONOCYTES NFR BLD AUTO: 10 %
NEUTROPHILS # BLD AUTO: 3.4 10E3/UL (ref 1.6–8.3)
NEUTROPHILS NFR BLD AUTO: 58 %
PLATELET # BLD AUTO: 242 10E3/UL (ref 150–450)
RBC # BLD AUTO: 5.09 10E6/UL (ref 4.4–5.9)
WBC # BLD AUTO: 5.9 10E3/UL (ref 4–11)

## 2022-06-24 PROCEDURE — 80053 COMPREHEN METABOLIC PANEL: CPT

## 2022-06-24 PROCEDURE — 36415 COLL VENOUS BLD VENIPUNCTURE: CPT

## 2022-06-24 PROCEDURE — 85025 COMPLETE CBC W/AUTO DIFF WBC: CPT

## 2022-06-24 PROCEDURE — 80061 LIPID PANEL: CPT

## 2022-06-24 NOTE — LETTER
"June 28, 2022      Jerome Urbina  [address] [place] [address]        Dear Jerome,     Your cholesterol is abnormal, please use the recommendations below and recheck labs in 6-12 months.     Ways to improve your cholesterol...     1- Eats less saturated fats (including avoiding \"trans\" fats).     2 - Eat more unsaturated fats  - found in vege   tables, grains, and tree nuts.   Also by replacing butter with canola oil or olive oil.     3 - Eat more nuts. 1-2 ounces (a small handful) of almonds, walnuts, hazelnuts or pecans once a day in place of other less healthy snacks.     4 - Eat more high fiber foods - vegetables and whole grains including oat bran, oats, beans, peas, and flax seed.     5 - Eat more fish - such as salmon, tuna, mackerel, and sardines.  1 or 2 six ounce servings per week is a healthy replacement for other proteins.     6 - Exercise for at least 120 minutes per week - which is equal to 30 minutes 4 days per week.         Sincerely,        Josh Coreas DO    Results for orders placed or performed in visit on 06/24/22   Lipid panel reflex to direct LDL Fasting     Status: Abnormal   Result Value Ref Range    Cholesterol 225 (H) <200 mg/dL    Triglycerides 87 <150 mg/dL    Direct Measure HDL 50 >=40 mg/dL    LDL Cholesterol Calculated 158 (H) <=100 mg/dL    Non HDL Cholesterol 175 (H) <130 mg/dL    Patient Fasting > 8hrs? Yes     Narrative    Cholesterol  Desirable:  <200 mg/dL    Triglycerides  Normal:  Less than 150 mg/dL  Borderline High:  150-199 mg/dL  High:  200-499 mg/dL  Very High:  Greater than or equal to 500 mg/dL    Direct Measure HDL  Female:  Greater than or equal to 50 mg/dL   Male:  Greater than or equal to 40 mg/dL    LDL Cholesterol  Desirable:  <100mg/dL  Above Desirable:  100-129 mg/dL   Borderline High:  130-159 mg/dL   High:  160-189 mg/dL   Very High:  >= 190 mg/dL    Non HDL Cholesterol  Desirable:  130 mg/dL  Above Desirable:  130-159 mg/dL  Borderline High:  " 160-189 mg/dL  High:  190-219 mg/dL  Very High:  Greater than or equal to 220 mg/dL   Comprehensive metabolic panel (BMP + Alb, Alk Phos, ALT, AST, Total. Bili, TP)     Status: Normal   Result Value Ref Range    Sodium 138 133 - 144 mmol/L    Potassium 4.4 3.4 - 5.3 mmol/L    Chloride 107 94 - 109 mmol/L    Carbon Dioxide (CO2) 24 20 - 32 mmol/L    Anion Gap 7 3 - 14 mmol/L    Urea Nitrogen 12 7 - 30 mg/dL    Creatinine 0.85 0.66 - 1.25 mg/dL    Calcium 9.1 8.5 - 10.1 mg/dL    Glucose 84 70 - 99 mg/dL    Alkaline Phosphatase 67 40 - 150 U/L    AST 19 0 - 45 U/L    ALT 32 0 - 70 U/L    Protein Total 7.8 6.8 - 8.8 g/dL    Albumin 4.1 3.4 - 5.0 g/dL    Bilirubin Total 0.6 0.2 - 1.3 mg/dL    GFR Estimate >90 >60 mL/min/1.73m2   CBC with platelets and differential     Status: None   Result Value Ref Range    WBC Count 5.9 4.0 - 11.0 10e3/uL    RBC Count 5.09 4.40 - 5.90 10e6/uL    Hemoglobin 14.7 13.3 - 17.7 g/dL    Hematocrit 44.9 40.0 - 53.0 %    MCV 88 78 - 100 fL    MCH 28.9 26.5 - 33.0 pg    MCHC 32.7 31.5 - 36.5 g/dL    RDW 13.5 10.0 - 15.0 %    Platelet Count 242 150 - 450 10e3/uL    % Neutrophils 58 %    % Lymphocytes 30 %    % Monocytes 10 %    % Eosinophils 2 %    % Basophils 0 %    Absolute Neutrophils 3.4 1.6 - 8.3 10e3/uL    Absolute Lymphocytes 1.8 0.8 - 5.3 10e3/uL    Absolute Monocytes 0.6 0.0 - 1.3 10e3/uL    Absolute Eosinophils 0.1 0.0 - 0.7 10e3/uL    Absolute Basophils 0.0 0.0 - 0.2 10e3/uL   CBC with platelets and differential     Status: None    Narrative    The following orders were created for panel order CBC with platelets and differential.  Procedure                               Abnormality         Status                     ---------                               -----------         ------                     CBC with platelets and d...[[id number]]                      Final result                 Please view results for these tests on the individual orders.   '

## 2022-06-26 LAB
ALBUMIN SERPL-MCNC: 4.1 G/DL (ref 3.4–5)
ALP SERPL-CCNC: 67 U/L (ref 40–150)
ALT SERPL W P-5'-P-CCNC: 32 U/L (ref 0–70)
ANION GAP SERPL CALCULATED.3IONS-SCNC: 7 MMOL/L (ref 3–14)
AST SERPL W P-5'-P-CCNC: 19 U/L (ref 0–45)
BILIRUB SERPL-MCNC: 0.6 MG/DL (ref 0.2–1.3)
BUN SERPL-MCNC: 12 MG/DL (ref 7–30)
CALCIUM SERPL-MCNC: 9.1 MG/DL (ref 8.5–10.1)
CHLORIDE BLD-SCNC: 107 MMOL/L (ref 94–109)
CHOLEST SERPL-MCNC: 225 MG/DL
CO2 SERPL-SCNC: 24 MMOL/L (ref 20–32)
CREAT SERPL-MCNC: 0.85 MG/DL (ref 0.66–1.25)
FASTING STATUS PATIENT QL REPORTED: YES
GFR SERPL CREATININE-BSD FRML MDRD: >90 ML/MIN/1.73M2
GLUCOSE BLD-MCNC: 84 MG/DL (ref 70–99)
HDLC SERPL-MCNC: 50 MG/DL
LDLC SERPL CALC-MCNC: 158 MG/DL
NONHDLC SERPL-MCNC: 175 MG/DL
POTASSIUM BLD-SCNC: 4.4 MMOL/L (ref 3.4–5.3)
PROT SERPL-MCNC: 7.8 G/DL (ref 6.8–8.8)
SODIUM SERPL-SCNC: 138 MMOL/L (ref 133–144)
TRIGL SERPL-MCNC: 87 MG/DL

## 2022-11-19 ENCOUNTER — HEALTH MAINTENANCE LETTER (OUTPATIENT)
Age: 42
End: 2022-11-19

## 2023-02-03 DIAGNOSIS — F33.1 MODERATE EPISODE OF RECURRENT MAJOR DEPRESSIVE DISORDER (H): ICD-10-CM

## 2023-02-03 RX ORDER — ESCITALOPRAM OXALATE 20 MG/1
TABLET ORAL
Qty: 90 TABLET | Refills: 0 | Status: SHIPPED | OUTPATIENT
Start: 2023-02-03

## 2023-04-20 ENCOUNTER — PATIENT OUTREACH (OUTPATIENT)
Dept: CARE COORDINATION | Facility: CLINIC | Age: 43
End: 2023-04-20
Payer: COMMERCIAL

## 2023-05-04 ENCOUNTER — PATIENT OUTREACH (OUTPATIENT)
Dept: CARE COORDINATION | Facility: CLINIC | Age: 43
End: 2023-05-04
Payer: COMMERCIAL

## 2023-07-02 ENCOUNTER — HEALTH MAINTENANCE LETTER (OUTPATIENT)
Age: 43
End: 2023-07-02

## 2024-08-25 ENCOUNTER — HEALTH MAINTENANCE LETTER (OUTPATIENT)
Age: 44
End: 2024-08-25